# Patient Record
Sex: FEMALE | Race: WHITE | Employment: FULL TIME | URBAN - METROPOLITAN AREA
[De-identification: names, ages, dates, MRNs, and addresses within clinical notes are randomized per-mention and may not be internally consistent; named-entity substitution may affect disease eponyms.]

---

## 2017-05-05 RX ORDER — NORETHINDRONE ACETATE AND ETHINYL ESTRADIOL 1MG-20(21)
1 KIT ORAL DAILY
Qty: 1 PACKET | Refills: 2 | Status: SHIPPED | OUTPATIENT
Start: 2017-05-05 | End: 2017-06-16 | Stop reason: SDUPTHER

## 2017-06-16 ENCOUNTER — OFFICE VISIT (OUTPATIENT)
Dept: OBGYN CLINIC | Age: 32
End: 2017-06-16
Payer: COMMERCIAL

## 2017-06-16 ENCOUNTER — HOSPITAL ENCOUNTER (OUTPATIENT)
Age: 32
Setting detail: SPECIMEN
Discharge: HOME | End: 2017-06-16
Payer: COMMERCIAL

## 2017-06-16 VITALS
DIASTOLIC BLOOD PRESSURE: 72 MMHG | BODY MASS INDEX: 29.51 KG/M2 | HEART RATE: 78 BPM | SYSTOLIC BLOOD PRESSURE: 112 MMHG | WEIGHT: 188 LBS | HEIGHT: 67 IN

## 2017-06-16 DIAGNOSIS — R10.2 PELVIC PAIN IN FEMALE: ICD-10-CM

## 2017-06-16 DIAGNOSIS — Z01.419 WELL WOMAN EXAM WITH ROUTINE GYNECOLOGICAL EXAM: Primary | ICD-10-CM

## 2017-06-16 LAB
-: ABNORMAL
AMORPHOUS: ABNORMAL
BACTERIA: ABNORMAL
BILIRUBIN URINE: NEGATIVE
CASTS UA: ABNORMAL /LPF
COLOR: YELLOW
COMMENT UA: ABNORMAL
CRYSTALS, UA: ABNORMAL /HPF
DIRECT EXAM: NORMAL
EPITHELIAL CELLS UA: ABNORMAL /HPF
GLUCOSE URINE: NEGATIVE
KETONES, URINE: NEGATIVE
LEUKOCYTE ESTERASE, URINE: NEGATIVE
Lab: NORMAL
MUCUS: ABNORMAL
NITRITE, URINE: NEGATIVE
OTHER OBSERVATIONS UA: ABNORMAL
PH UA: 6 (ref 5–8)
PROTEIN UA: NEGATIVE
RBC UA: ABNORMAL /HPF
RENAL EPITHELIAL, UA: ABNORMAL /HPF
SPECIFIC GRAVITY UA: 1.01 (ref 1–1.03)
SPECIMEN DESCRIPTION: NORMAL
SPECIMEN DESCRIPTION: NORMAL
STATUS: NORMAL
TRICHOMONAS: ABNORMAL
TURBIDITY: CLEAR
URINE HGB: ABNORMAL
UROBILINOGEN, URINE: NORMAL
WBC UA: ABNORMAL /HPF
YEAST: ABNORMAL

## 2017-06-16 PROCEDURE — 87660 TRICHOMONAS VAGIN DIR PROBE: CPT

## 2017-06-16 PROCEDURE — 87480 CANDIDA DNA DIR PROBE: CPT

## 2017-06-16 PROCEDURE — 99395 PREV VISIT EST AGE 18-39: CPT | Performed by: NURSE PRACTITIONER

## 2017-06-16 PROCEDURE — 87510 GARDNER VAG DNA DIR PROBE: CPT

## 2017-06-16 PROCEDURE — 81001 URINALYSIS AUTO W/SCOPE: CPT

## 2017-06-16 PROCEDURE — 87491 CHLMYD TRACH DNA AMP PROBE: CPT

## 2017-06-16 PROCEDURE — 87591 N.GONORRHOEAE DNA AMP PROB: CPT

## 2017-06-16 PROCEDURE — 36415 COLL VENOUS BLD VENIPUNCTURE: CPT

## 2017-06-16 RX ORDER — PANTOPRAZOLE SODIUM 40 MG/1
TABLET, DELAYED RELEASE ORAL DAILY
Refills: 0 | COMMUNITY
Start: 2017-06-01

## 2017-06-16 RX ORDER — GABAPENTIN 100 MG/1
CAPSULE ORAL
Refills: 0 | COMMUNITY
Start: 2017-04-13 | End: 2019-08-05

## 2017-06-16 RX ORDER — NORETHINDRONE ACETATE AND ETHINYL ESTRADIOL 1MG-20(21)
1 KIT ORAL DAILY
Qty: 1 PACKET | Refills: 12 | Status: SHIPPED | OUTPATIENT
Start: 2017-06-16 | End: 2017-06-16 | Stop reason: CLARIF

## 2017-06-16 RX ORDER — PRAVASTATIN SODIUM 20 MG
TABLET ORAL DAILY
Refills: 0 | COMMUNITY
Start: 2017-05-26

## 2017-06-16 RX ORDER — VALACYCLOVIR HYDROCHLORIDE 1 G/1
TABLET, FILM COATED ORAL
Refills: 0 | COMMUNITY
Start: 2017-04-13 | End: 2019-12-10

## 2017-06-16 RX ORDER — NORETHINDRONE ACETATE AND ETHINYL ESTRADIOL 1MG-20(21)
1 KIT ORAL DAILY
Qty: 1 PACKET | Refills: 12 | Status: SHIPPED | OUTPATIENT
Start: 2017-06-16 | End: 2018-06-11 | Stop reason: SDUPTHER

## 2017-06-16 RX ORDER — BUSPIRONE HYDROCHLORIDE 10 MG/1
TABLET ORAL
Refills: 0 | COMMUNITY
Start: 2017-05-23 | End: 2019-08-05

## 2017-06-16 RX ORDER — IBUPROFEN 800 MG/1
TABLET ORAL EVERY 8 HOURS PRN
Refills: 0 | Status: ON HOLD | COMMUNITY
Start: 2017-05-04 | End: 2020-07-20 | Stop reason: HOSPADM

## 2017-06-16 ASSESSMENT — PATIENT HEALTH QUESTIONNAIRE - PHQ9
1. LITTLE INTEREST OR PLEASURE IN DOING THINGS: 0
SUM OF ALL RESPONSES TO PHQ9 QUESTIONS 1 & 2: 0
2. FEELING DOWN, DEPRESSED OR HOPELESS: 0
SUM OF ALL RESPONSES TO PHQ QUESTIONS 1-9: 0

## 2017-06-19 LAB
C TRACH DNA GENITAL QL NAA+PROBE: NEGATIVE
N. GONORRHOEAE DNA: NEGATIVE

## 2018-06-11 RX ORDER — NORETHINDRONE ACETATE AND ETHINYL ESTRADIOL AND FERROUS FUMARATE 1MG-20(21)
KIT ORAL
Qty: 28 TABLET | Refills: 0 | Status: SHIPPED | OUTPATIENT
Start: 2018-06-11 | End: 2018-07-01 | Stop reason: SDUPTHER

## 2018-07-02 ENCOUNTER — HOSPITAL ENCOUNTER (OUTPATIENT)
Age: 33
Setting detail: SPECIMEN
Discharge: HOME | End: 2018-07-02
Payer: COMMERCIAL

## 2018-07-02 ENCOUNTER — OFFICE VISIT (OUTPATIENT)
Dept: OBGYN CLINIC | Age: 33
End: 2018-07-02
Payer: COMMERCIAL

## 2018-07-02 VITALS
WEIGHT: 216 LBS | BODY MASS INDEX: 33.9 KG/M2 | HEART RATE: 80 BPM | SYSTOLIC BLOOD PRESSURE: 116 MMHG | DIASTOLIC BLOOD PRESSURE: 72 MMHG | HEIGHT: 67 IN

## 2018-07-02 DIAGNOSIS — Z11.51 SPECIAL SCREENING EXAMINATION FOR HUMAN PAPILLOMAVIRUS (HPV): ICD-10-CM

## 2018-07-02 DIAGNOSIS — Z01.419 WELL WOMAN EXAM: ICD-10-CM

## 2018-07-02 DIAGNOSIS — N94.6 DYSMENORRHEA: ICD-10-CM

## 2018-07-02 DIAGNOSIS — Z01.419 WELL WOMAN EXAM: Primary | ICD-10-CM

## 2018-07-02 PROCEDURE — G0145 SCR C/V CYTO,THINLAYER,RESCR: HCPCS

## 2018-07-02 PROCEDURE — 87624 HPV HI-RISK TYP POOLED RSLT: CPT

## 2018-07-02 PROCEDURE — 99395 PREV VISIT EST AGE 18-39: CPT | Performed by: NURSE PRACTITIONER

## 2018-07-02 RX ORDER — NORETHINDRONE ACETATE AND ETHINYL ESTRADIOL AND FERROUS FUMARATE 1MG-20(21)
KIT ORAL
Qty: 28 TABLET | Refills: 0 | Status: SHIPPED | OUTPATIENT
Start: 2018-07-02 | End: 2018-07-02 | Stop reason: SDUPTHER

## 2018-07-02 RX ORDER — NORETHINDRONE ACETATE AND ETHINYL ESTRADIOL 1MG-20(21)
1 KIT ORAL DAILY
Qty: 28 TABLET | Refills: 11 | Status: SHIPPED | OUTPATIENT
Start: 2018-07-02 | End: 2019-07-16 | Stop reason: SDUPTHER

## 2018-07-02 NOTE — PATIENT INSTRUCTIONS
Patient Education        Combination Birth Control Pills: Care Instructions  Your Care Instructions    Combination birth control pills are used to prevent pregnancy. They give you a regular dose of the hormones estrogen and progestin. You take a hormone pill every day to prevent pregnancy. Birth control pills come in packs. The most common type has 3 weeks of hormone pills. Some packs have sugar pills (they do not contain any hormones) for the fourth week. During that fourth no-hormone week, you have your period. After the fourth week (28 days), you start a new pack. Some birth control pills are packaged in different ways. For example, some have hormone pills for the fourth week instead of sugar pills. Taking hormones for the entire month causes you to not have periods or to have fewer periods. Others are packaged so that you have a period every 3 months. Your doctor will tell you what type of pills you have. Follow-up care is a key part of your treatment and safety. Be sure to make and go to all appointments, and call your doctor if you are having problems. It's also a good idea to know your test results and keep a list of the medicines you take. How can you care for yourself at home? How do you take the pill? · Follow your doctor's instructions about when to start taking your pills. Use backup birth control, such as a condom, or don't have intercourse for 7 days after you start your pills. · Take your pills every day, at about the same time of day. To help yourself do this, try to take them when you do something else every day, such as brushing your teeth. What if you forget to take a pill? Always read the label for specific instructions, or call your doctor. Here are some basic guidelines:  · If you miss 1 hormone pill, take it as soon as you remember. Ask your doctor if you may need to use a backup birth control method, such as a condom, or not have intercourse.   · If you miss 2 or more hormone pills,

## 2018-07-02 NOTE — PROGRESS NOTES
History Narrative    No narrative on file       MEDICATIONS:  Current Outpatient Prescriptions   Medication Sig Dispense Refill    norethindrone-ethinyl estradiol (JUNEL FE 1/20) 1-20 MG-MCG per tablet Take 1 tablet by mouth daily for 28 days 28 tablet 11    busPIRone (BUSPAR) 10 MG tablet   0    gabapentin (NEURONTIN) 100 MG capsule take 1 capsule by mouth at bedtime  0    ibuprofen (ADVIL;MOTRIN) 800 MG tablet take 1 tablet by mouth three times a day  0    pantoprazole (PROTONIX) 40 MG tablet   0    pravastatin (PRAVACHOL) 20 MG tablet   0    valACYclovir (VALTREX) 1 g tablet take 1 tablet by mouth three times a day for 7 days  0     No current facility-administered medications for this visit. ALLERGIES:  Allergies as of 07/02/2018 - Review Complete 07/02/2018   Allergen Reaction Noted    Flagyl [metronidazole] Nausea And Vomiting 10/10/2012       Symptoms of decreased mood absent  Symptoms of anhedonia absent    **If either question is answered in a  positive fashion then complete the PHQ9 Scoring Evaluation and make the appropriate referral**      Immunization status: stated as current, but no records available. Gynecologic History:  Menarche: 13 yo  Menopause at [address] [facility] yo     Patient's last menstrual period was 06/25/2018 (exact date). Sexually Active: No    STD History: No     Permanent Sterilization: No   Reversible Birth Control: Yes OCPs        Hormone Replacement Exposure: No      Genetic Qualified Family History of Breast, Ovarian , Colon or Uterine Cancer: Yes ( maternal grandmother with breast cancer in her 76s,  paternal grandmother with breast cancer- unsure of age). Patient given information on genetic testing- patient declines. If YES see scanned worksheet.     Preventative Health Testing:    Health Maintenance:  Health Maintenance Due   Topic Date Due    HIV screen  07/12/1998    DTaP/Tdap/Td vaccine (1 - Tdap) 07/12/2002       Date of Last Pap Smear: 6/2/2016 was noted. Digits were evaluated without abnormal findings. Range of motion, stability and strength were evaluated and found to be appropriate for the patients age. ASSESSMENT:      27 y.o. Annual   Diagnosis Orders   1. Well woman exam  PAP SMEAR   2. Dysmenorrhea  norethindrone-ethinyl estradiol (JUNEL FE 1/20) 1-20 MG-MCG per tablet   3. Special screening examination for human papillomavirus (HPV)  PAP SMEAR          Chief Complaint   Patient presents with    Gynecologic Exam          History reviewed. No pertinent past medical history. There are no active problems to display for this patient. Hereditary Breast, Ovarian, Colon and Uterine Cancer screening Done. Tobacco & Secondary smoke risks reviewed; instructed on cessation and avoidance      Counseling Completed:  Preventative Health Recommendations and Follow up. The patient was informed of the recommended preventative health recommendations. 1. Annuals every year; Cytology collections per prevailing guidelines. 2. Mammograms begin every year at 37 yo if no abnormalities are found and no family     History. 3. Bone density studies every 2-3 years. Begin at 71 yo. If no fracture history or osteoporosis family history. (significant). 4. Colonoscopy begin at 40 yo. Repeat every ten years if negative and no family history. 5. Calcium of 2859-7844 mg/day in split dosing  6. Vitamin D 400-800 IU/day  7. All other preventative health recommendations will be managed by the patients Primary care physician. Counseling Hormonal Based Birth Control:      The patient was seen and counseled on all forms of birth control both male and female  reversible and non. She is aware that hormonal based birth control may increase her risk of developing a blood clot which may increase her morbidity and or mortality. She was counseled on alternate non hormonal based contraception options.   We discussed that smoking and any hormonal

## 2018-07-05 LAB
HPV SAMPLE: NORMAL
HPV SOURCE: NORMAL
HPV, GENOTYPE 16: NOT DETECTED
HPV, GENOTYPE 18: NOT DETECTED
HPV, HIGH RISK OTHER: NOT DETECTED
HPV, INTERPRETATION: NORMAL

## 2018-07-18 LAB — CYTOLOGY REPORT: NORMAL

## 2018-10-03 DIAGNOSIS — N83.201 RIGHT OVARIAN CYST: Primary | ICD-10-CM

## 2018-10-09 ENCOUNTER — HOSPITAL ENCOUNTER (OUTPATIENT)
Dept: ULTRASOUND IMAGING | Age: 33
Discharge: HOME | End: 2018-10-11
Payer: COMMERCIAL

## 2018-10-09 DIAGNOSIS — N83.201 RIGHT OVARIAN CYST: ICD-10-CM

## 2018-10-09 PROCEDURE — 76830 TRANSVAGINAL US NON-OB: CPT

## 2018-10-09 PROCEDURE — 76856 US EXAM PELVIC COMPLETE: CPT

## 2018-10-10 ENCOUNTER — TELEPHONE (OUTPATIENT)
Dept: OBGYN CLINIC | Age: 33
End: 2018-10-10

## 2019-07-16 DIAGNOSIS — N94.6 DYSMENORRHEA: ICD-10-CM

## 2019-07-17 RX ORDER — NORETHINDRONE ACETATE AND ETHINYL ESTRADIOL AND FERROUS FUMARATE 1MG-20(21)
KIT ORAL
Qty: 28 TABLET | Refills: 0 | Status: SHIPPED | OUTPATIENT
Start: 2019-07-17 | End: 2019-08-22 | Stop reason: SDUPTHER

## 2019-08-05 ENCOUNTER — HOSPITAL ENCOUNTER (OUTPATIENT)
Age: 34
Setting detail: SPECIMEN
Discharge: HOME | End: 2019-08-05
Payer: COMMERCIAL

## 2019-08-05 ENCOUNTER — OFFICE VISIT (OUTPATIENT)
Dept: OBGYN CLINIC | Age: 34
End: 2019-08-05
Payer: COMMERCIAL

## 2019-08-05 VITALS
WEIGHT: 226 LBS | HEART RATE: 78 BPM | DIASTOLIC BLOOD PRESSURE: 78 MMHG | SYSTOLIC BLOOD PRESSURE: 122 MMHG | BODY MASS INDEX: 35.47 KG/M2 | HEIGHT: 67 IN

## 2019-08-05 DIAGNOSIS — N76.1 CHRONIC VAGINITIS: ICD-10-CM

## 2019-08-05 DIAGNOSIS — Z01.419 WELL WOMAN EXAM WITH ROUTINE GYNECOLOGICAL EXAM: ICD-10-CM

## 2019-08-05 DIAGNOSIS — R35.0 URINARY FREQUENCY: ICD-10-CM

## 2019-08-05 DIAGNOSIS — N94.10 DYSPAREUNIA IN FEMALE: ICD-10-CM

## 2019-08-05 DIAGNOSIS — Z01.419 WELL WOMAN EXAM WITH ROUTINE GYNECOLOGICAL EXAM: Primary | ICD-10-CM

## 2019-08-05 LAB
-: ABNORMAL
AMORPHOUS: ABNORMAL
BACTERIA: ABNORMAL
BILIRUBIN URINE: NEGATIVE
CASTS UA: ABNORMAL /LPF
COLOR: YELLOW
COMMENT UA: ABNORMAL
CRYSTALS, UA: ABNORMAL /HPF
DIRECT EXAM: NORMAL
EPITHELIAL CELLS UA: ABNORMAL /HPF
GLUCOSE URINE: NEGATIVE
KETONES, URINE: NEGATIVE
LEUKOCYTE ESTERASE, URINE: ABNORMAL
Lab: NORMAL
MUCUS: ABNORMAL
NITRITE, URINE: NEGATIVE
OTHER OBSERVATIONS UA: ABNORMAL
PH UA: 6 (ref 5–8)
PROTEIN UA: NEGATIVE
RBC UA: ABNORMAL /HPF
RENAL EPITHELIAL, UA: ABNORMAL /HPF
SPECIFIC GRAVITY UA: 1.01 (ref 1–1.03)
SPECIMEN DESCRIPTION: NORMAL
TRICHOMONAS: ABNORMAL
TURBIDITY: ABNORMAL
URINE HGB: NEGATIVE
UROBILINOGEN, URINE: NORMAL
WBC UA: ABNORMAL /HPF
YEAST: ABNORMAL

## 2019-08-05 PROCEDURE — 87491 CHLMYD TRACH DNA AMP PROBE: CPT

## 2019-08-05 PROCEDURE — 87591 N.GONORRHOEAE DNA AMP PROB: CPT

## 2019-08-05 PROCEDURE — 87480 CANDIDA DNA DIR PROBE: CPT

## 2019-08-05 PROCEDURE — 99395 PREV VISIT EST AGE 18-39: CPT | Performed by: CLINICAL NURSE SPECIALIST

## 2019-08-05 PROCEDURE — 87660 TRICHOMONAS VAGIN DIR PROBE: CPT

## 2019-08-05 PROCEDURE — 87510 GARDNER VAG DNA DIR PROBE: CPT

## 2019-08-05 PROCEDURE — 81001 URINALYSIS AUTO W/SCOPE: CPT

## 2019-08-05 PROCEDURE — 87086 URINE CULTURE/COLONY COUNT: CPT

## 2019-08-05 RX ORDER — ESCITALOPRAM OXALATE 20 MG/1
TABLET ORAL
Refills: 0 | COMMUNITY
Start: 2019-07-17 | End: 2019-12-10

## 2019-08-05 NOTE — PROGRESS NOTES
tablet by mouth three times a day for 7 days  0     No current facility-administered medications for this visit. ALLERGIES:  Allergies as of 08/05/2019 - Review Complete 08/05/2019   Allergen Reaction Noted    Flagyl [metronidazole] Nausea And Vomiting 10/10/2012       Symptoms of decreased mood absent  Symptoms of anhedonia absent    **If either question is answered in a  positive fashion then complete the PHQ9 Scoring Evaluation and make the appropriate referral**      Immunization status: stated up to date but no current records on file. Gynecologic History:  Menarche: 13 yo  Menopause at [address] [address] [place] yo     Patient's last menstrual period was 07/26/2019 (approximate). Sexually Active: Yes    STD History: No     Permanent Sterilization: No   Reversible Birth Control: Yes pill        Hormone Replacement Exposure: No      Genetic Qualified Family History of Breast, Ovarian , Colon or Uterine Cancer: Yes MGM, PGM breast CA and both over the age of 48, but patient not sure of exact age     If YES see scanned worksheet. Preventative Health Testing:    Health Maintenance:  Health Maintenance Due   Topic Date Due    Varicella Vaccine (1 of 2 - 13+ 2-dose series) 07/12/1996    HIV screen  07/12/1998    DTaP/Tdap/Td vaccine (1 - Tdap) 07/12/2002       Date of Last Pap Smear: 7/21/18 N/N  Abnormal Pap Smear History: NA  Colposcopy History: NA  Date of Last Mammogram: NA  Date of Last Colonoscopy: NA  Date of Last Bone Density:      ________________________________________________________________________        REVIEW OF SYSTEMS:    Yes   A minimum of an eleven point review of systems was completed. Review Of Systems (11 point):  Constitutional: No fever, chills or malaise;  No weight change or fatigue  Head and Eyes: No vision, Headache, Dizziness or trauma in last 12 months  ENT ROS: No hearing, Tinnitis, sinus or taste problems  Hematological and Lymphatic ROS:No Lymphoma, Von Willebrand's, Hemophillia

## 2019-08-06 ENCOUNTER — TELEPHONE (OUTPATIENT)
Dept: OBGYN CLINIC | Age: 34
End: 2019-08-06

## 2019-08-06 DIAGNOSIS — N30.00 ACUTE CYSTITIS WITHOUT HEMATURIA: Primary | ICD-10-CM

## 2019-08-06 LAB
CULTURE: NORMAL
Lab: NORMAL
SPECIMEN DESCRIPTION: NORMAL

## 2019-08-06 RX ORDER — NITROFURANTOIN 25; 75 MG/1; MG/1
100 CAPSULE ORAL 2 TIMES DAILY
Qty: 14 CAPSULE | Refills: 0 | Status: SHIPPED | OUTPATIENT
Start: 2019-08-06 | End: 2019-08-13

## 2019-08-08 LAB
C TRACH DNA GENITAL QL NAA+PROBE: NEGATIVE
N. GONORRHOEAE DNA: NEGATIVE
SPECIMEN DESCRIPTION: NORMAL

## 2019-08-13 ENCOUNTER — TELEPHONE (OUTPATIENT)
Dept: OBGYN CLINIC | Age: 34
End: 2019-08-13

## 2019-08-13 DIAGNOSIS — R35.0 URINARY FREQUENCY: Primary | ICD-10-CM

## 2019-08-22 DIAGNOSIS — N94.6 DYSMENORRHEA: ICD-10-CM

## 2019-08-22 RX ORDER — NORETHINDRONE ACETATE AND ETHINYL ESTRADIOL AND FERROUS FUMARATE 1MG-20(21)
KIT ORAL
Qty: 28 TABLET | Refills: 11 | Status: ON HOLD | OUTPATIENT
Start: 2019-08-22 | End: 2020-07-20 | Stop reason: HOSPADM

## 2019-11-12 ENCOUNTER — TELEPHONE (OUTPATIENT)
Dept: OBGYN CLINIC | Age: 34
End: 2019-11-12

## 2019-12-10 ENCOUNTER — OFFICE VISIT (OUTPATIENT)
Dept: OBGYN CLINIC | Age: 34
End: 2019-12-10
Payer: COMMERCIAL

## 2019-12-10 VITALS
DIASTOLIC BLOOD PRESSURE: 78 MMHG | HEART RATE: 72 BPM | SYSTOLIC BLOOD PRESSURE: 110 MMHG | WEIGHT: 226 LBS | BODY MASS INDEX: 35.47 KG/M2 | HEIGHT: 67 IN

## 2019-12-10 DIAGNOSIS — D25.2 SUBSEROUS LEIOMYOMA OF UTERUS: Primary | ICD-10-CM

## 2019-12-10 DIAGNOSIS — R10.2 PELVIC PAIN IN FEMALE: ICD-10-CM

## 2019-12-10 PROCEDURE — G8417 CALC BMI ABV UP PARAM F/U: HCPCS | Performed by: OBSTETRICS & GYNECOLOGY

## 2019-12-10 PROCEDURE — 99213 OFFICE O/P EST LOW 20 MIN: CPT | Performed by: OBSTETRICS & GYNECOLOGY

## 2019-12-10 PROCEDURE — 1036F TOBACCO NON-USER: CPT | Performed by: OBSTETRICS & GYNECOLOGY

## 2019-12-10 PROCEDURE — G8427 DOCREV CUR MEDS BY ELIG CLIN: HCPCS | Performed by: OBSTETRICS & GYNECOLOGY

## 2019-12-10 PROCEDURE — G8484 FLU IMMUNIZE NO ADMIN: HCPCS | Performed by: OBSTETRICS & GYNECOLOGY

## 2019-12-10 RX ORDER — MONTELUKAST SODIUM 10 MG/1
10 TABLET ORAL NIGHTLY
COMMUNITY

## 2019-12-10 RX ORDER — FLUOXETINE HYDROCHLORIDE 20 MG/1
20 CAPSULE ORAL DAILY
COMMUNITY

## 2020-03-09 ENCOUNTER — OFFICE VISIT (OUTPATIENT)
Dept: OBGYN CLINIC | Age: 35
End: 2020-03-09
Payer: COMMERCIAL

## 2020-03-09 PROCEDURE — 76856 US EXAM PELVIC COMPLETE: CPT | Performed by: OBSTETRICS & GYNECOLOGY

## 2020-03-09 PROCEDURE — 76830 TRANSVAGINAL US NON-OB: CPT | Performed by: OBSTETRICS & GYNECOLOGY

## 2020-03-10 ENCOUNTER — TELEPHONE (OUTPATIENT)
Dept: OBGYN CLINIC | Age: 35
End: 2020-03-10

## 2020-04-16 ENCOUNTER — TELEMEDICINE (OUTPATIENT)
Dept: OBGYN CLINIC | Age: 35
End: 2020-04-16
Payer: COMMERCIAL

## 2020-04-16 VITALS — BODY MASS INDEX: 34.53 KG/M2 | WEIGHT: 220 LBS | HEIGHT: 67 IN | HEART RATE: 87 BPM

## 2020-04-16 PROCEDURE — G8427 DOCREV CUR MEDS BY ELIG CLIN: HCPCS | Performed by: OBSTETRICS & GYNECOLOGY

## 2020-04-16 PROCEDURE — 99214 OFFICE O/P EST MOD 30 MIN: CPT | Performed by: OBSTETRICS & GYNECOLOGY

## 2020-04-16 PROCEDURE — 1036F TOBACCO NON-USER: CPT | Performed by: OBSTETRICS & GYNECOLOGY

## 2020-04-16 PROCEDURE — G8417 CALC BMI ABV UP PARAM F/U: HCPCS | Performed by: OBSTETRICS & GYNECOLOGY

## 2020-04-16 RX ORDER — CETIRIZINE HYDROCHLORIDE 10 MG/1
10 TABLET ORAL DAILY
COMMUNITY

## 2020-04-16 NOTE — PROGRESS NOTES
mouth once daily 28 tablet 11    ibuprofen (ADVIL;MOTRIN) 800 MG tablet take 1 tablet by mouth three times a day  0    pantoprazole (PROTONIX) 40 MG tablet   0    pravastatin (PRAVACHOL) 20 MG tablet   0     No current facility-administered medications for this visit. ALLERGIES:  Allergies as of 04/16/2020 - Review Complete 04/16/2020   Allergen Reaction Noted    Flagyl [metronidazole] Nausea And Vomiting 10/10/2012         Pulse 87, height 5' 7\" (1.702 m), weight 220 lb (99.8 kg), last menstrual period 04/02/2020, not currently breastfeeding. PE is limited due to the virtual visit    Abdomen: Soft non-tender; good bowel sounds. No guarding, rebound or rigidity. No CVA tenderness bilaterally reported when questioned. (Viewed Virtually)    Extremities: No calf tenderness, DTR 2/4, and No edema bilaterally as inspected by video and palpation by the patient (Viewed Virtually)    Pelvic: (Virtual Visit-Not Completed)    Diagnostics:            Lab Results:  Results for orders placed or performed during the hospital encounter of 08/05/19   VAGINITIS DNA PROBE   Result Value Ref Range    Specimen Description . VAGINA     Special Requests NOT REPORTED     Direct Exam NEGATIVE for Candida sp. Direct Exam NEGATIVE for Gardnerella vaginalis     Direct Exam NEGATIVE for Trichomonas vaginalis     Direct Exam       Method of testing is a DNA probe intended for detection and identification of Candida species, Gardnerella vaginalis, and Trichomonas vaginalis nucleic acid in vaginal fluid specimens from patients with symptoms of vaginitis/vaginosis. C.trachomatis N.gonorrhoeae DNA   Result Value Ref Range    Specimen Description . CERVIX     C. trachomatis DNA NEGATIVE NEGATIVE    N. gonorrhoeae DNA NEGATIVE NEGATIVE   Urine culture clean catch   Result Value Ref Range    Specimen Description . CLEAN CATCH URINE     Special Requests NOT REPORTED     Culture NO SIGNIFICANT GROWTH    Urinalysis Reflex to Culture Result Value Ref Range    Color, UA YELLOW YELLOW    Turbidity UA CLOUDY (A) CLEAR    Glucose, Ur NEGATIVE NEGATIVE    Bilirubin Urine NEGATIVE NEGATIVE    Ketones, Urine NEGATIVE NEGATIVE    Specific Gravity, UA 1.007 1.000 - 1.030    Urine Hgb NEGATIVE NEGATIVE    pH, UA 6.0 5.0 - 8.0    Protein, UA NEGATIVE NEGATIVE    Urobilinogen, Urine Normal Normal    Nitrite, Urine NEGATIVE NEGATIVE    Leukocyte Esterase, Urine LARGE (A) NEGATIVE    Urinalysis Comments NOT REPORTED    Microscopic Urinalysis   Result Value Ref Range    -          WBC, UA 20 TO 50 /HPF    RBC, UA 2 TO 5 /HPF    Casts UA NOT REPORTED /LPF    Crystals, UA NOT REPORTED None /HPF    Epithelial Cells UA 10 TO 20 /HPF    Renal Epithelial, UA NOT REPORTED 0 /HPF    Bacteria, UA MANY (A) None    Mucus, UA NOT REPORTED None    Trichomonas, UA NOT REPORTED None    Amorphous, UA NOT REPORTED None    Other Observations UA NOT REPORTED NOT REQ. Yeast, UA NOT REPORTED None     Cytology Report 07/02/2018  9:12  [facility]   (NOTE)   [id number]   [address] [place], [address]   [phone]   Fax: [phone]   GYNECOLOGIC CYTOLOGY REPORT     Patient Name: Clau Landrum   MR#: [id number]   Specimen #[id number]   Source:   1: Cervical material, (ThinPrep vial, Imaging-assisted review)     Clinical History   Contraceptive use   Z01.419 Routine gyn exam without abnormal findings   Z11.51 Encounter for screening for HPV   Co-Test:  ThinPrep Pap with high risk HPV testing   LMP:  6/25/2018   INTERPRETATION     Cervical material, (ThinPrep vial, Imaging-assisted review):   Specimen Adequacy:       Satisfactory for evaluation.       -Endocervical/transformation zone component is absent. Descriptive Diagnosis:       Negative for intraepithelial lesion or malignancy. Comments:       High Risk HPV testing was ordered.        Cytotechnologist:   CJ Cuba(ASCP)   **Electronically Signed Out**   merry/7/18/2018           Procedure/Addendum   HPV Procedure Report     Date Ordered:     7/3/2018     Status: Signed   Out       Date Complete:     7/3/2018     By: CJ Mercado(ASCP)       Date Reported:     7/18/2018       INTERPRETATION   Roche HPV DNA High Risk                                   HPV Sample               Thin Prep                    (Ref Range)   HPV Type 16               Not Detected                    (Not   Detected)   HPV Type 18               Not Detected                    (Not   Detected)   Other High Risk HPV     Not Detected                    (Not Detected)       This test amplifies and detects DNA of 14 high-risk HPV types   associated with cervical cancer and its precursor lesions (HPV types   16, 18, 31, 33, 35, 39, 45, 51, 52, 56, 58, 59, 66, and 68). Sensitivity may be affected by specimen collection methods, stage of   infection, and the presence of interfering substances.  Results should   be interpreted in conjunction with other available laboratory and   clinical data.  A negative high-risk HPV result does not exclude the   possibility of future cytologic HSIL or underlying CIN2-3 or cancer. This test is intended for medical purposes only and is not valid for   the evaluation of suspected sexual abuse or for other forensic   purposes.                Assessment:   Diagnosis Orders   1. Subserous leiomyoma of uterus posterior 5.1 x 5.3 x 4.5 cm     2. Dysmenorrhea     3. Dyspareunia in female     4. Menorrhagia with regular cycle     5. On OCP's for symptoms-NON Smoker      Chief Complaint   Patient presents with    Follow-up         There are no active problems to display for this patient. PLAN:  Return in about 1 week (around 4/23/2020) [facility]. RTO 1-2 wks emb hscope  Plan hysterectomy with BLS possible enterocele and Myomectomy ovarian preservation  Return to the office in 1-2 weeks.   Pt declined and had reviewed with her : [place], Lupron, IUD, on ocp's wishes to get off. Failed NSAIDS  Counseled on preventative health maintenance follow-up. No orders of the defined types were placed in this encounter. The uterus was not immobile. The patient  does have a Narrow Pubic Arch. Her uterus is found to be undescended. The uterine size is 10-12 cm and irregular. I counseled the patient on ALL routes to complete her surgery: Vaginal, Laparoscopic Assisted, Robotic Assisted, and Traditional open technique. The morbidity, procedural costs, recuperative times, infection rates, blood loss, duration of hospital stay and complication rates were discussed with each route. The patient has elected to proceed with TICO. John Williamson is a 37 y.o. female female was evaluated by a Virtual Visit (video visit) encounter to address concerns as mentioned above. A caregiver was present when appropriate. Due to this being a TeleHealth encounter (During QGNTO-39 public health emergency), evaluation of the following organ systems was limited: Vitals/Constitutional/EENT/Resp/CV/GI//MS/Neuro/Skin/Heme-Lymph-Imm. Pursuant to the emergency declaration under the [facility], [address] [place] [address] authority and the Eligible and [employer] Act, this Virtual Visit was conducted with patient's (and/or legal guardian's) consent, to reduce the patient's risk of exposure to COVID-19 and provide necessary medical care. The patient (and/or legal guardian) has also been advised to contact this office for worsening conditions or problems, and seek emergency medical treatment and/or call 911 if deemed necessary. Services were provided through a video synchronous discussion virtually to substitute for in-person clinic visit. Patient and provider were located at their individual homes.     Electronically signed by Griselda Lea DO on 4/16/20 at 9:20 AM EDT     An electronic signature was used to authenticate this note. The Virtual Visit time of 25 minutes. More than 50% of this visit was counseling and education regarding The primary encounter diagnosis was Subserous leiomyoma of uterus posterior 5.1 x 5.3 x 4.5 cm. Diagnoses of Dysmenorrhea, Dyspareunia in female, and Menorrhagia with regular cycle were also pertinent to this visit. and Follow-up   as well as  counseling on preventative health maintenance follow-up.

## 2020-05-11 ENCOUNTER — HOSPITAL ENCOUNTER (OUTPATIENT)
Age: 35
Setting detail: SPECIMEN
Discharge: HOME | End: 2020-05-11
Payer: COMMERCIAL

## 2020-05-11 ENCOUNTER — PROCEDURE VISIT (OUTPATIENT)
Dept: OBGYN CLINIC | Age: 35
End: 2020-05-11
Payer: COMMERCIAL

## 2020-05-11 VITALS
SYSTOLIC BLOOD PRESSURE: 112 MMHG | HEIGHT: 67 IN | WEIGHT: 220 LBS | TEMPERATURE: 97.7 F | BODY MASS INDEX: 34.53 KG/M2 | DIASTOLIC BLOOD PRESSURE: 80 MMHG | HEART RATE: 70 BPM

## 2020-05-11 PROBLEM — D25.2 SUBSEROUS LEIOMYOMA OF UTERUS: Status: ACTIVE | Noted: 2020-05-11

## 2020-05-11 PROBLEM — N92.0 MENORRHAGIA WITH REGULAR CYCLE: Status: ACTIVE | Noted: 2020-05-11

## 2020-05-11 PROBLEM — N94.10 DYSPAREUNIA IN FEMALE: Status: ACTIVE | Noted: 2020-05-11

## 2020-05-11 PROBLEM — N94.6 DYSMENORRHEA: Status: ACTIVE | Noted: 2020-05-11

## 2020-05-11 LAB
ABSOLUTE EOS #: 0.27 K/UL (ref 0–0.44)
ABSOLUTE IMMATURE GRANULOCYTE: 0.03 K/UL (ref 0–0.3)
ABSOLUTE LYMPH #: 1.8 K/UL (ref 1.1–3.7)
ABSOLUTE MONO #: 0.58 K/UL (ref 0.1–1.2)
BASOPHILS # BLD: 1 % (ref 0–2)
BASOPHILS ABSOLUTE: 0.04 K/UL (ref 0–0.2)
CONTROL: NORMAL
DIFFERENTIAL TYPE: ABNORMAL
EOSINOPHILS RELATIVE PERCENT: 3 % (ref 1–4)
HCT VFR BLD CALC: 38.7 % (ref 36.3–47.1)
HEMOGLOBIN: 12.7 G/DL (ref 11.9–15.1)
IMMATURE GRANULOCYTES: 0 %
INR BLD: 0.9
LYMPHOCYTES # BLD: 21 % (ref 24–43)
MCH RBC QN AUTO: 29 PG (ref 25.2–33.5)
MCHC RBC AUTO-ENTMCNC: 32.8 G/DL (ref 28.4–34.8)
MCV RBC AUTO: 88.4 FL (ref 82.6–102.9)
MONOCYTES # BLD: 7 % (ref 3–12)
NRBC AUTOMATED: 0 PER 100 WBC
PARTIAL THROMBOPLASTIN TIME: 21.4 SEC (ref 20.5–30.5)
PDW BLD-RTO: 12.7 % (ref 11.8–14.4)
PLATELET # BLD: 332 K/UL (ref 138–453)
PLATELET ESTIMATE: ABNORMAL
PMV BLD AUTO: 11.9 FL (ref 8.1–13.5)
PREGNANCY TEST URINE, POC: NEGATIVE
PROTHROMBIN TIME: 9.4 SEC (ref 9–12)
RBC # BLD: 4.38 M/UL (ref 3.95–5.11)
RBC # BLD: ABNORMAL 10*6/UL
SEG NEUTROPHILS: 68 % (ref 36–65)
SEGMENTED NEUTROPHILS ABSOLUTE COUNT: 5.79 K/UL (ref 1.5–8.1)
TSH SERPL DL<=0.05 MIU/L-ACNC: 2.82 MIU/L (ref 0.3–5)
WBC # BLD: 8.5 K/UL (ref 3.5–11.3)
WBC # BLD: ABNORMAL 10*3/UL

## 2020-05-11 PROCEDURE — 81025 URINE PREGNANCY TEST: CPT | Performed by: OBSTETRICS & GYNECOLOGY

## 2020-05-11 PROCEDURE — 58558 HYSTEROSCOPY BIOPSY: CPT | Performed by: OBSTETRICS & GYNECOLOGY

## 2020-05-11 PROCEDURE — 88305 TISSUE EXAM BY PATHOLOGIST: CPT

## 2020-05-11 NOTE — PROGRESS NOTES
[facility]  Flexible-3mm  Hysteroscopy Procedure Note      Kenzie Walkers  5/11/2020  Patient's last menstrual period was 05/05/2020. Chief Complaint   Patient presents with    Procedure         Blood pressure 112/80, pulse 70, temperature 97.7 °F (36.5 °C), height 5' 7\" (1.702 m), weight 220 lb (99.8 kg), last menstrual period 05/05/2020, not currently breastfeeding. UltraSound Findings:           LABS:  UPT: negative    6/17/2016  1:07 PM - [facility]   Cytology Report 06/02/2016  8:04 AM - [facility]   (NOTE)   [id number]   [facility]   [facility]   ANATOMIC PATHOLOGY   [address] [place] [address], [place] [address]. [place], [address]   [phone]   Fax: [phone]   GYNECOLOGIC CYTOLOGY REPORT     Patient Name: Hilario Mortimer   MR#: [id number]   Specimen #[id number]   Source:   1: Cervical material, (ThinPrep vial, Imaging-assisted review)     Clinical History   Birth control pills   Z01.419 Routine gyn exam without abnormal findings   Z11.51 Encounter for screening for HPV   Co-Test:  ThinPrep Pap with high risk HPV testing   LMP:  5/26/16   INTERPRETATION     Cervical material, (ThinPrep vial, Imaging-assisted review):   Specimen Adequacy:       Satisfactory for evaluation.       - Endocervical/transformation zone component present. Descriptive Diagnosis:       Negative for intraepithelial lesion or malignancy. Comments:       High Risk HPV testing was ordered.        Cytotechnologist:   CJ Cuba(ASCP)   **Electronically Signed Out**   merry/6/17/2016           Procedure/Addendum   HPV Procedure Report     Date Ordered:     6/6/2016     Status: Signed   Out       Date Complete:     6/6/2016     By: CJ Young(ASCP)       Date Reported:     6/17/2016       INTERPRETATION   Roche HPV DNA High Risk                                   HPV Sample               Thin Prep                    (Ref Range)   HPV Type 16               Not Detected                    (Not   Detected)   HPV Type 18               Not Detected                    (Not   Detected)   Other High Risk HPV     Not Detected                    (Not Detected)       This test amplifies and detects DNA of 14 high-risk HPV types   associated with cervical cancer and its precursor lesions (HPV types   16, 18, 31, 33, 35, 39, 45, 51, 52, 56, 58, 59, 66, and 68). Sensitivity may be affected by specimen collection methods, stage of   infection, and the presence of interfering substances.  Results should   be interpreted in conjunction with other available laboratory and   clinical data.  A negative high-risk HPV result does not exclude the   possibility of future cytologic HSIL or underlying CIN2-3 or cancer. This test is intended for medical purposes only and is not valid for   the evaluation of suspected sexual abuse or for other forensic   purposes.              Procedure visit on 05/11/2020   Component Date Value Ref Range Status    Preg Test, Ur 05/11/2020 negative   Final    lot:  [id number] exp: 06/30/2021    Control 05/11/2020 done   Final   ]    Diagnosis:    Diagnosis Orders   1. Subserous leiomyoma of uterus posterior 5.1 x 5.3 x 4.5 cm  SC HYSTEROSCOPY,W/ENDO BX    Specimen to Pathology Outpatient   2. Dysmenorrhea  SC HYSTEROSCOPY,W/ENDO BX    Specimen to Pathology Outpatient   3. Negative pregnancy test  POCT urine pregnancy   4. Dyspareunia in female     5.  Menorrhagia with regular cycle  CBC Auto Differential    TSH with Reflex    Protime-INR    APTT     Patient Active Problem List    Diagnosis Date Noted    Dysmenorrhea 05/11/2020     Priority: High    Subserous leiomyoma of uterus posterior 5.1 x 5.3 x 4.5 cm 05/11/2020     Priority: High    Dyspareunia in female 05/11/2020     Priority: High    Menorrhagia with regular cycle 05/11/2020     Priority: High         Procedure Note:  After voiding, the patient returned to the exam room where she was Priority: High    Menorrhagia with regular cycle 2020     Priority: High           Recommendations:  Return to the office for follow-up in 2 weeks to review the pathology of the biopsy and for further recommendations. Patient is considering hysterectomy. The patient is a . She does NOT want any children. She is not . I reviewed conservative options; IUD Nexplanon . .. Smoker hx >36 yo, to remove her fibroid with or without Ablation and leave her uterus so she could attempt conception and a family. She declined and does not want any children and is demanding definitive surgery. Pt to bring letter. Labs ordered-See above  No family hx of ovarian cancer      The uterus was immobile. The patient  does have a Narrow Pubic Arch. Her uterus is found to be undescended. The uterine size is 10-14 cm and irregular. I counseled the patient on ALL routes to complete her surgery: Vaginal, Laparoscopic Assisted, Robotic Assisted, and Traditional open technique. The morbidity, procedural costs, recuperative times, infection rates, blood loss, duration of hospital stay and complication rates were discussed with each route. The patient has elected to proceed with Open.           Claire Gee,

## 2020-05-14 LAB — SURGICAL PATHOLOGY REPORT: NORMAL

## 2020-05-29 ENCOUNTER — TELEMEDICINE (OUTPATIENT)
Dept: OBGYN CLINIC | Age: 35
End: 2020-05-29
Payer: COMMERCIAL

## 2020-05-29 VITALS — HEIGHT: 67 IN | HEART RATE: 71 BPM | WEIGHT: 220 LBS | BODY MASS INDEX: 34.53 KG/M2

## 2020-05-29 PROCEDURE — G8427 DOCREV CUR MEDS BY ELIG CLIN: HCPCS | Performed by: OBSTETRICS & GYNECOLOGY

## 2020-05-29 PROCEDURE — 99214 OFFICE O/P EST MOD 30 MIN: CPT | Performed by: OBSTETRICS & GYNECOLOGY

## 2020-05-29 NOTE — PROGRESS NOTES
[id number] [phone]   Fax: [phone]   GYNECOLOGIC CYTOLOGY REPORT     Patient Name: Jennifer Bowling   MR#: [id number]   Specimen #[id number]   Source:   1: Cervical material, (ThinPrep vial, Imaging-assisted review)     Clinical History   Birth control pills   Z01.419 Routine gyn exam without abnormal findings   Z11.51 Encounter for screening for HPV   Co-Test:  ThinPrep Pap with high risk HPV testing   LMP:  5/26/16   INTERPRETATION     Cervical material, (ThinPrep vial, Imaging-assisted review):   Specimen Adequacy:       Satisfactory for evaluation.       - Endocervical/transformation zone component present. Descriptive Diagnosis:       Negative for intraepithelial lesion or malignancy. Comments:       High Risk HPV testing was ordered. Cytotechnologist:   CJ Cuba(ASCP)   **Electronically Signed Out**   merry/6/17/2016           Procedure/Addendum   HPV Procedure Report     Date Ordered:     6/6/2016     Status: Signed   Out       Date Complete:     6/6/2016     By: CJ Coleman(ASCP)       Date Reported:     6/17/2016       INTERPRETATION   Roche HPV DNA High Risk                                   HPV Sample               Thin Prep                    (Ref Range)   HPV Type 16               Not Detected                    (Not   Detected)   HPV Type 18               Not Detected                    (Not   Detected)   Other High Risk HPV     Not Detected                    (Not Detected)       This test amplifies and detects DNA of 14 high-risk HPV types   associated with cervical cancer and its precursor lesions (HPV types   16, 18, 31, 33, 35, 39, 45, 51, 52, 56, 58, 59, 66, and 68).    Sensitivity may be affected by specimen collection methods, stage of   infection, and the presence of interfering substances.  Results should   be interpreted in conjunction with other available laboratory and   clinical data.  A negative high-risk HPV result does not exclude the   possibility of future cytologic HSIL or underlying CIN2-3 or cancer. This test is intended for medical purposes only and is not valid for   the evaluation of suspected sexual abuse or for other forensic   purposes.                 Lab Results:  Results for orders placed or performed during the hospital encounter of 05/11/20   CBC Auto Differential   Result Value Ref Range    WBC 8.5 3.5 - 11.3 k/uL    RBC 4.38 3.95 - 5.11 m/uL    Hemoglobin 12.7 11.9 - 15.1 g/dL    Hematocrit 38.7 36.3 - 47.1 %    MCV 88.4 82.6 - 102.9 fL    MCH 29.0 25.2 - 33.5 pg    MCHC 32.8 28.4 - 34.8 g/dL    RDW 12.7 11.8 - 14.4 %    Platelets 738 228 - 842 k/uL    MPV 11.9 8.1 - 13.5 fL    NRBC Automated 0.0 0.0 per 100 WBC    Differential Type NOT REPORTED     Seg Neutrophils 68 (H) 36 - 65 %    Lymphocytes 21 (L) 24 - 43 %    Monocytes 7 3 - 12 %    Eosinophils % 3 1 - 4 %    Basophils 1 0 - 2 %    Immature Granulocytes 0 0 %    Segs Absolute 5.79 1.50 - 8.10 k/uL    Absolute Lymph # 1.80 1.10 - 3.70 k/uL    Absolute Mono # 0.58 0.10 - 1.20 k/uL    Absolute Eos # 0.27 0.00 - 0.44 k/uL    Basophils Absolute 0.04 0.00 - 0.20 k/uL    Absolute Immature Granulocyte 0.03 0.00 - 0.30 k/uL    WBC Morphology NOT REPORTED     RBC Morphology NOT REPORTED     Platelet Estimate NOT REPORTED    TSH with Reflex   Result Value Ref Range    TSH 2.82 0.30 - 5.00 mIU/L   Protime-INR   Result Value Ref Range    Protime 9.4 9.0 - 12.0 sec    INR 0.9    APTT   Result Value Ref Range    PTT 21.4 20.5 - 30.5 sec     5/14/2020 11:44 AM - Cesar Lawrence Incoming Lab Results From [facility]     Component Collected Lab   Surgical Pathology Report 05/11/2020 12:13  [facility]   [address].    [place], [address] [place] [address]   [phone]   Fax: [phone]   SURGICAL PATHOLOGY REPORT     Patient Name: Nikole Cadet   MR#: [id number]   Specimen #[id number]         Final Diagnosis   ENDOMETRIUM, BIOPSY:          BENIGN ENDOMETRIAL

## 2020-07-01 ENCOUNTER — OFFICE VISIT (OUTPATIENT)
Dept: OBGYN CLINIC | Age: 35
End: 2020-07-01
Payer: COMMERCIAL

## 2020-07-01 VITALS
BODY MASS INDEX: 36.73 KG/M2 | HEART RATE: 70 BPM | SYSTOLIC BLOOD PRESSURE: 116 MMHG | TEMPERATURE: 98.1 F | DIASTOLIC BLOOD PRESSURE: 86 MMHG | HEIGHT: 67 IN | WEIGHT: 234 LBS

## 2020-07-01 PROCEDURE — G8427 DOCREV CUR MEDS BY ELIG CLIN: HCPCS | Performed by: OBSTETRICS & GYNECOLOGY

## 2020-07-01 PROCEDURE — 99213 OFFICE O/P EST LOW 20 MIN: CPT | Performed by: OBSTETRICS & GYNECOLOGY

## 2020-07-01 PROCEDURE — G8417 CALC BMI ABV UP PARAM F/U: HCPCS | Performed by: OBSTETRICS & GYNECOLOGY

## 2020-07-01 PROCEDURE — 1036F TOBACCO NON-USER: CPT | Performed by: OBSTETRICS & GYNECOLOGY

## 2020-07-01 NOTE — H&P (VIEW-ONLY)
[address] [facility] Gynecology  [facility] [address]; [address]  [place] [phone] Fax        Arpan Canada  1985  Primary Care Physician: Jad Mirza MD        [address]: [facility]      2020  MEDICAID CONSENT COMPLETED: No      HPI: Arpan Canada is a 37 y.o. female   Pt has the diagnosis/symptoms below affecting her ADL's. Conservative medical and surgical options were reviewed to preserve her uterus for future fertility. She has declined all other options and is demanding hysterectomy. SEE LETTER WRITTEN BY PATIENT. 1. Dysmenorrhea    2. Subserous leiomyoma of uterus posterior 5.1 x 5.3 x 4.5 cm    3. Menorrhagia with regular cycle    4. Dyspareunia in female    5. Pelvic pain in female            The uterus was immobile. The patient  does have a Narrow Pubic Arch. Her uterus is found to be undescended. The uterine size is 10-14 cm and irregular. I counseled the patient on ALL routes to complete her surgery: Vaginal, Laparoscopic Assisted, Robotic Assisted, and Traditional open technique. The morbidity, procedural costs, recuperative times, infection rates, blood loss, duration of hospital stay and complication rates were discussed with each route. The patient has elected to proceed with Open. Relevant Past History:   Patient Active Problem List    Diagnosis Date Noted    Dysmenorrhea 2020     Priority: High    Subserous leiomyoma of uterus posterior 5.1 x 5.3 x 4.5 cm 2020     Priority: High    Dyspareunia in female 2020     Priority: High    Menorrhagia with regular cycle 2020     Priority: High         OB History    Para Term  AB Living   0 0 0 0 0 0   SAB TAB Ectopic Molar Multiple Live Births   0 0 0 0 0 0       Past Medical History:   Diagnosis Date    Anxiety     Clostridium difficile infection        History reviewed. No pertinent surgical history.     Social History Socioeconomic History    Marital status: Single     Spouse name: Not on file    Number of children: Not on file    Years of education: Not on file    Highest education level: Not on file   Occupational History    Not on file   Social Needs    Financial resource strain: Not on file    Food insecurity     Worry: Not on file     Inability: Not on file    Transportation needs     Medical: Not on file     Non-medical: Not on file   Tobacco Use    Smoking status: Former Smoker     Packs/day: 1.50     Years: 9.50     Pack years: 14.25     Types: Cigarettes    Smokeless tobacco: Never Used   Substance and Sexual Activity    Alcohol use:  Yes     Alcohol/week: 0.0 standard drinks     Comment: OCC    Drug use: No    Sexual activity: Yes     Partners: Male     Birth control/protection: Pill   Lifestyle    Physical activity     Days per week: Not on file     Minutes per session: Not on file    Stress: Not on file   Relationships    Social connections     Talks on phone: Not on file     Gets together: Not on file     Attends Denominational service: Not on file     Active member of club or organization: Not on file     Attends meetings of clubs or organizations: Not on file     Relationship status: Not on file    Intimate partner violence     Fear of current or ex partner: Not on file     Emotionally abused: Not on file     Physically abused: Not on file     Forced sexual activity: Not on file   Other Topics Concern    Not on file   Social History Narrative    Not on file       Psychosocial History: stable    MEDICATIONS:  Current Outpatient Medications   Medication Sig Dispense Refill    cetirizine (ZYRTEC) 10 MG tablet Take 10 mg by mouth daily      FLUoxetine (PROZAC) 20 MG capsule Take 20 mg by mouth daily      montelukast (SINGULAIR) 10 MG tablet Take 10 mg by mouth nightly      JUNEL FE 1/20 1-20 MG-MCG per tablet take 1 tablet by mouth once daily 28 tablet 11    ibuprofen (ADVIL;MOTRIN) 800 MG tablet take 1 tablet by mouth three times a day  0    pantoprazole (PROTONIX) 40 MG tablet   0    pravastatin (PRAVACHOL) 20 MG tablet   0    Multiple Vitamins-Minerals (VITAMIN D3 COMPLETE PO)        No current facility-administered medications for this visit. ALLERGIES:  Allergies as of 07/01/2020 - Review Complete 07/01/2020   Allergen Reaction Noted    Flagyl [metronidazole] Nausea And Vomiting 10/10/2012           REVIEW OF SYSTEMS:      General appearance:Appears healthy. Alert; in no acute distress. Pleasant. HEENT: Glasses or contacts no  CARDIOVASCULAR: Denies: chest pain, dyspnea on exertion, palpitations  RESPIRATORY: Denies: cough, shortness of breath, wheezing  GI: Denies: abdominal pain, flank pain, nausea, vomiting, diarrhea  : Denies: dysuria, frequency/urgency, hematuria, pelvic pain  MUSCULOSKELETAL: Denies: back pain, joint swelling, muscle pain  SKIN: Denies: rash, hives. HEMATOLOGIC: Denies Bleeding Disorder, Coagulopathy or Transfusion  NEUROLOGIC: Denies Migraines, Headaches, CVA, TIA  ENDOCRINE: Denies any Endocrinologic disorders                PHYSICAL EXAM:  Blood pressure 116/86, pulse 70, temperature 98.1 °F (36.7 °C), temperature source Temporal, height 5' 7\" (1.702 m), weight 234 lb (106.1 kg), last menstrual period 06/20/2020, not currently breastfeeding. General Appearance:  awake, alert, oriented, in no acute distress, well developed, well nourished, in no acute distress   Skin:  Skin color, texture, turgor normal. No rashes or lesions  Mouth/Throat:  Mucosa moist.  No lesions. Pharynx without erythema, edema or exudate. Lungs:  Normal expansion. Clear to auscultation. No rales, rhonchi, or wheezing. Heart:  Heart sounds are normal.  Regular rate and rhythm without murmur, gallop or rub. Breast:  Inspection negative. No nipple discharge or bleeding. No palpable mass  Abdomen:  Soft, non-tender, normal bowel sounds.   No bruits, organomegaly or masses. Extremities: Extremities warm to touch, pink, with no edema. Musculoskeletal:  negative  Peripheral Pulses:  Normal  Neurologic:  Gait normal. Reflexes normal and symmetric. Sensation grossly intact. Female Urogen:  declined  Female Rectal: declined    OMM Structural Component:  The patient did not complain of a Chief complaint requiring OMM. Chief Complaint:none    Structural Exam: No Interest              Diagnostics:  [facility] Findings:              LABS:  UPT: negative     6/17/2016  1:07 PM - [place], Lab [employer]      Component Collected Lab   Cytology Report 06/02/2016  8:04 AM [facility]   (NOTE)   [id number]   [place]   [facility]   ANATOMIC PATHOLOGY   [address] [place] [address],  [address]. [place], [address]   [phone]   Fax: [phone]   GYNECOLOGIC CYTOLOGY REPORT     Patient Name: Fritz Salinas   MR#: [id number]   Specimen #[id number]   Source:   1: Cervical material, (ThinPrep vial, Imaging-assisted review)     Clinical History   Birth control pills   Z01.419 Routine gyn exam without abnormal findings   Z11.51 Encounter for screening for HPV   Co-Test:  ThinPrep Pap with high risk HPV testing   LMP:  5/26/16   INTERPRETATION     Cervical material, (ThinPrep vial, Imaging-assisted review):   Specimen Adequacy:       Satisfactory for evaluation.       - Endocervical/transformation zone component present. Descriptive Diagnosis:       Negative for intraepithelial lesion or malignancy. Comments:       High Risk HPV testing was ordered.        Cytotechnologist:   CJ Cuba(ASCP)   **Electronically Signed Out**   merry/6/17/2016           Procedure/Addendum   HPV Procedure Report     Date Ordered:     6/6/2016     Status: Signed   Out       Date Complete:     6/6/2016     By: CJ Timmons(ASCP)       Date Reported:     6/17/2016       INTERPRETATION   Roche HPV DNA High Risk                                   HPV Sample               Thin Prep                    (Ref Range)   HPV Type 16               Not Detected                    (Not   Detected)   HPV Type 18               Not Detected                    (Not   Detected)   Other High Risk HPV     Not Detected                    (Not Detected)       This test amplifies and detects DNA of 14 high-risk HPV types   associated with cervical cancer and its precursor lesions (HPV types   16, 18, 31, 33, 35, 39, 45, 51, 52, 56, 58, 59, 66, and 68). Sensitivity may be affected by specimen collection methods, stage of   infection, and the presence of interfering substances.  Results should   be interpreted in conjunction with other available laboratory and   clinical data.  A negative high-risk HPV result does not exclude the   possibility of future cytologic HSIL or underlying CIN2-3 or cancer.    This test is intended for medical purposes only and is not valid for   the evaluation of suspected sexual abuse or for other forensic   purposes.                    Lab Results:        Results for orders placed or performed during the hospital encounter of 05/11/20   CBC Auto Differential   Result Value Ref Range     WBC 8.5 3.5 - 11.3 k/uL     RBC 4.38 3.95 - 5.11 m/uL     Hemoglobin 12.7 11.9 - 15.1 g/dL     Hematocrit 38.7 36.3 - 47.1 %     MCV 88.4 82.6 - 102.9 fL     MCH 29.0 25.2 - 33.5 pg     MCHC 32.8 28.4 - 34.8 g/dL     RDW 12.7 11.8 - 14.4 %     Platelets 686 167 - 849 k/uL     MPV 11.9 8.1 - 13.5 fL     NRBC Automated 0.0 0.0 per 100 WBC     Differential Type NOT REPORTED       Seg Neutrophils 68 (H) 36 - 65 %     Lymphocytes 21 (L) 24 - 43 %     Monocytes 7 3 - 12 %     Eosinophils % 3 1 - 4 %     Basophils 1 0 - 2 %     Immature Granulocytes 0 0 %     Segs Absolute 5.79 1.50 - 8.10 k/uL     Absolute Lymph # 1.80 1.10 - 3.70 k/uL     Absolute Mono # 0.58 0.10 - 1.20 k/uL     Absolute Eos # 0.27 0.00 - 0.44 k/uL     Basophils Absolute 0.04 0.00 - 0.20 k/uL     Absolute Immature Granulocyte 0.03 Lymphocytes 21 (L) 24 - 43 %    Monocytes 7 3 - 12 %    Eosinophils % 3 1 - 4 %    Basophils 1 0 - 2 %    Immature Granulocytes 0 0 %    Segs Absolute 5.79 1.50 - 8.10 k/uL    Absolute Lymph # 1.80 1.10 - 3.70 k/uL    Absolute Mono # 0.58 0.10 - 1.20 k/uL    Absolute Eos # 0.27 0.00 - 0.44 k/uL    Basophils Absolute 0.04 0.00 - 0.20 k/uL    Absolute Immature Granulocyte 0.03 0.00 - 0.30 k/uL    WBC Morphology NOT REPORTED     RBC Morphology NOT REPORTED     Platelet Estimate NOT REPORTED    TSH with Reflex   Result Value Ref Range    TSH 2.82 0.30 - 5.00 mIU/L   Protime-INR   Result Value Ref Range    Protime 9.4 9.0 - 12.0 sec    INR 0.9    APTT   Result Value Ref Range    PTT 21.4 20.5 - 30.5 sec           The patient was counseled at length about the risks of lashawn Covid-19 in the emerson-operative and post-operative states including the recovery window of their procedure. The patient was made aware that lashawn Covid-19 after a surgical procedure may worsen their prognosis for recovering from the virus and lend to a higher morbidity and or mortality risk. The patient was given the options of postponing their procedure. All of the risks, benefits, and alternatives were discussed. The patient  does wish to proceed with the procedure. Assessment:     Diagnosis Orders   1. Dysmenorrhea     2. Subserous leiomyoma of uterus posterior 5.1 x 5.3 x 4.5 cm     3. Menorrhagia with regular cycle     4. Dyspareunia in female     5. Pelvic pain in female         Patient Active Problem List    Diagnosis Date Noted    Dysmenorrhea 05/11/2020     Priority: High    Subserous leiomyoma of uterus posterior 5.1 x 5.3 x 4.5 cm 05/11/2020     Priority: High    Dyspareunia in female 05/11/2020     Priority: High    Menorrhagia with regular cycle 05/11/2020     Priority: High       PAP: see above  Permanent Sterilization Completed: No     Plan:  1. TICO BLS  2. +/- Enterocele  3. Negative Wound vac placement  4.  Body mass index is 36.65 kg/m². No orders of the defined types were placed in this encounter. Counseling: The patient was counseled on all options both medical and surgical, conservative as well as definitive. She has elected to proceed with the procedure as stated above. The patient was counseled on the procedure. Risks and complications were reviewed in detail. The patients orders, labs, consents have been completed. The history and physical as well as all supporting surgical documentation will be forwarded to the pre-operative holding area. The patient is aware that this procedure may not alleviate her symptoms. That there may be a necessity for a second surgery and that there may be an incomplete removal of abnormal tissue. A supracervical approach was reviewed in detail, leaving the cervix intact.                    ________________________________________D. O.  Date:_______________  Linda Diggs DO

## 2020-07-13 ENCOUNTER — HOSPITAL ENCOUNTER (OUTPATIENT)
Dept: PREADMISSION TESTING | Age: 35
Discharge: HOME | End: 2020-07-17
Payer: COMMERCIAL

## 2020-07-13 VITALS
HEART RATE: 71 BPM | HEIGHT: 67 IN | OXYGEN SATURATION: 100 % | BODY MASS INDEX: 34.53 KG/M2 | WEIGHT: 220 LBS | TEMPERATURE: 97.7 F | RESPIRATION RATE: 20 BRPM | DIASTOLIC BLOOD PRESSURE: 77 MMHG | SYSTOLIC BLOOD PRESSURE: 126 MMHG

## 2020-07-13 LAB
-: ABNORMAL
ABO/RH: NORMAL
ABSOLUTE EOS #: 0.4 K/UL (ref 0–0.4)
ABSOLUTE IMMATURE GRANULOCYTE: ABNORMAL K/UL (ref 0–0.3)
ABSOLUTE LYMPH #: 1.8 K/UL (ref 1–4.8)
ABSOLUTE MONO #: 0.6 K/UL (ref 0.1–1.3)
AMORPHOUS: ABNORMAL
ANION GAP SERPL CALCULATED.3IONS-SCNC: 10 MMOL/L (ref 9–17)
ANTIBODY SCREEN: NEGATIVE
ARM BAND NUMBER: NORMAL
BACTERIA: ABNORMAL
BASOPHILS # BLD: 1 % (ref 0–2)
BASOPHILS ABSOLUTE: 0.1 K/UL (ref 0–0.2)
BILIRUBIN URINE: NEGATIVE
BLOOD BANK COMMENT: NORMAL
BUN BLDV-MCNC: 10 MG/DL (ref 6–20)
BUN/CREAT BLD: NORMAL (ref 9–20)
CALCIUM SERPL-MCNC: 9.3 MG/DL (ref 8.6–10.4)
CASTS UA: ABNORMAL /LPF
CHLORIDE BLD-SCNC: 103 MMOL/L (ref 98–107)
CO2: 23 MMOL/L (ref 20–31)
COLOR: YELLOW
COMMENT UA: ABNORMAL
CREAT SERPL-MCNC: 0.66 MG/DL (ref 0.5–0.9)
CRYSTALS, UA: ABNORMAL /HPF
DIFFERENTIAL TYPE: ABNORMAL
EOSINOPHILS RELATIVE PERCENT: 5 % (ref 0–4)
EPITHELIAL CELLS UA: ABNORMAL /HPF
EXPIRATION DATE: NORMAL
GFR AFRICAN AMERICAN: >60 ML/MIN
GFR NON-AFRICAN AMERICAN: >60 ML/MIN
GFR SERPL CREATININE-BSD FRML MDRD: NORMAL ML/MIN/{1.73_M2}
GFR SERPL CREATININE-BSD FRML MDRD: NORMAL ML/MIN/{1.73_M2}
GLUCOSE BLD-MCNC: 90 MG/DL (ref 70–99)
GLUCOSE URINE: NEGATIVE
HCG QUANTITATIVE: <1 IU/L
HCT VFR BLD CALC: 38.6 % (ref 36–46)
HEMOGLOBIN: 13.1 G/DL (ref 12–16)
IMMATURE GRANULOCYTES: ABNORMAL %
INR BLD: 0.9
KETONES, URINE: NEGATIVE
LEUKOCYTE ESTERASE, URINE: ABNORMAL
LYMPHOCYTES # BLD: 21 % (ref 24–44)
MCH RBC QN AUTO: 29.2 PG (ref 26–34)
MCHC RBC AUTO-ENTMCNC: 34 G/DL (ref 31–37)
MCV RBC AUTO: 85.9 FL (ref 80–100)
MONOCYTES # BLD: 7 % (ref 1–7)
MUCUS: ABNORMAL
NITRITE, URINE: NEGATIVE
NRBC AUTOMATED: ABNORMAL PER 100 WBC
OTHER OBSERVATIONS UA: ABNORMAL
PARTIAL THROMBOPLASTIN TIME: 23.4 SEC (ref 24–36)
PDW BLD-RTO: 13.2 % (ref 11.5–14.9)
PH UA: 6 (ref 5–8)
PLATELET # BLD: 302 K/UL (ref 150–450)
PLATELET ESTIMATE: ABNORMAL
PMV BLD AUTO: 9.1 FL (ref 6–12)
POTASSIUM SERPL-SCNC: 4.5 MMOL/L (ref 3.7–5.3)
PROTEIN UA: NEGATIVE
PROTHROMBIN TIME: 11.5 SEC (ref 11.8–14.6)
RBC # BLD: 4.49 M/UL (ref 4–5.2)
RBC # BLD: ABNORMAL 10*6/UL
RBC UA: ABNORMAL /HPF
RENAL EPITHELIAL, UA: ABNORMAL /HPF
SEG NEUTROPHILS: 66 % (ref 36–66)
SEGMENTED NEUTROPHILS ABSOLUTE COUNT: 5.7 K/UL (ref 1.3–9.1)
SODIUM BLD-SCNC: 136 MMOL/L (ref 135–144)
SPECIFIC GRAVITY UA: 1.01 (ref 1–1.03)
TRICHOMONAS: ABNORMAL
TURBIDITY: CLEAR
URINE HGB: NEGATIVE
UROBILINOGEN, URINE: NORMAL
WBC # BLD: 8.5 K/UL (ref 3.5–11)
WBC # BLD: ABNORMAL 10*3/UL
WBC UA: ABNORMAL /HPF
YEAST: ABNORMAL

## 2020-07-13 PROCEDURE — 87086 URINE CULTURE/COLONY COUNT: CPT

## 2020-07-13 PROCEDURE — 36415 COLL VENOUS BLD VENIPUNCTURE: CPT

## 2020-07-13 PROCEDURE — 85730 THROMBOPLASTIN TIME PARTIAL: CPT

## 2020-07-13 PROCEDURE — 81001 URINALYSIS AUTO W/SCOPE: CPT

## 2020-07-13 PROCEDURE — 86900 BLOOD TYPING SEROLOGIC ABO: CPT

## 2020-07-13 PROCEDURE — 85025 COMPLETE CBC W/AUTO DIFF WBC: CPT

## 2020-07-13 PROCEDURE — 84702 CHORIONIC GONADOTROPIN TEST: CPT

## 2020-07-13 PROCEDURE — 86901 BLOOD TYPING SEROLOGIC RH(D): CPT

## 2020-07-13 PROCEDURE — 80048 BASIC METABOLIC PNL TOTAL CA: CPT

## 2020-07-13 PROCEDURE — 85610 PROTHROMBIN TIME: CPT

## 2020-07-13 PROCEDURE — 86850 RBC ANTIBODY SCREEN: CPT

## 2020-07-13 RX ORDER — M-VIT,TX,IRON,MINS/CALC/FOLIC 27MG-0.4MG
1 TABLET ORAL DAILY
COMMUNITY

## 2020-07-14 ENCOUNTER — ANESTHESIA EVENT (OUTPATIENT)
Dept: OPERATING ROOM | Age: 35
DRG: 743 | End: 2020-07-14
Payer: COMMERCIAL

## 2020-07-14 ENCOUNTER — TELEPHONE (OUTPATIENT)
Dept: OBGYN CLINIC | Age: 35
End: 2020-07-14

## 2020-07-14 LAB
CULTURE: NORMAL
Lab: NORMAL
SPECIMEN DESCRIPTION: NORMAL

## 2020-07-14 RX ORDER — SODIUM CHLORIDE, SODIUM LACTATE, POTASSIUM CHLORIDE, CALCIUM CHLORIDE 600; 310; 30; 20 MG/100ML; MG/100ML; MG/100ML; MG/100ML
INJECTION, SOLUTION INTRAVENOUS CONTINUOUS
Status: CANCELLED | OUTPATIENT
Start: 2020-07-14

## 2020-07-14 RX ORDER — SODIUM CHLORIDE 0.9 % (FLUSH) 0.9 %
10 SYRINGE (ML) INJECTION EVERY 12 HOURS SCHEDULED
Status: CANCELLED | OUTPATIENT
Start: 2020-07-14

## 2020-07-14 RX ORDER — SCOLOPAMINE TRANSDERMAL SYSTEM 1 MG/1
1 PATCH, EXTENDED RELEASE TRANSDERMAL
Status: CANCELLED | OUTPATIENT
Start: 2020-07-14 | End: 2020-07-17

## 2020-07-14 RX ORDER — LIDOCAINE HYDROCHLORIDE 10 MG/ML
1 INJECTION, SOLUTION EPIDURAL; INFILTRATION; INTRACAUDAL; PERINEURAL
Status: CANCELLED | OUTPATIENT
Start: 2020-07-14 | End: 2020-07-14

## 2020-07-14 RX ORDER — SODIUM CHLORIDE 0.9 % (FLUSH) 0.9 %
10 SYRINGE (ML) INJECTION PRN
Status: CANCELLED | OUTPATIENT
Start: 2020-07-14

## 2020-07-14 NOTE — TELEPHONE ENCOUNTER
----- Message from ONI Roldan NP sent at 7/13/2020 11:05 AM EDT -----  PTT and INR decreased  Please refer to PCP  Patient scheduled for TICO on 7/20/2020  Please have physician review

## 2020-07-14 NOTE — TELEPHONE ENCOUNTER
Patient aware accordingly. No follow up instructions from physician at this time. Will review with provider and notify patient if follow up is necessary. Patient admits to motrin use for pelvic pain, along with alcohol consumption over the weekend.

## 2020-07-16 ENCOUNTER — HOSPITAL ENCOUNTER (OUTPATIENT)
Dept: PREADMISSION TESTING | Age: 35
Setting detail: SPECIMEN
Discharge: HOME | End: 2020-07-20
Payer: COMMERCIAL

## 2020-07-16 PROCEDURE — U0004 COV-19 TEST NON-CDC HGH THRU: HCPCS

## 2020-07-17 LAB
SARS-COV-2, PCR: NOT DETECTED
SARS-COV-2, RAPID: NORMAL
SARS-COV-2: NORMAL
SOURCE: NORMAL

## 2020-07-18 ENCOUNTER — TELEPHONE (OUTPATIENT)
Dept: PRIMARY CARE CLINIC | Age: 35
End: 2020-07-18

## 2020-07-20 ENCOUNTER — ANESTHESIA (OUTPATIENT)
Dept: OPERATING ROOM | Age: 35
DRG: 743 | End: 2020-07-20
Payer: COMMERCIAL

## 2020-07-20 ENCOUNTER — HOSPITAL ENCOUNTER (INPATIENT)
Age: 35
LOS: 1 days | Discharge: HOME HEALTH | DRG: 743 | End: 2020-07-21
Attending: OBSTETRICS & GYNECOLOGY | Admitting: OBSTETRICS & GYNECOLOGY
Payer: COMMERCIAL

## 2020-07-20 VITALS — OXYGEN SATURATION: 100 % | SYSTOLIC BLOOD PRESSURE: 139 MMHG | DIASTOLIC BLOOD PRESSURE: 86 MMHG | TEMPERATURE: 96.4 F

## 2020-07-20 PROBLEM — Z90.710 S/P TOTAL ABDOMINAL HYSTERECTOMY: Status: ACTIVE | Noted: 2020-07-20

## 2020-07-20 PROBLEM — Z98.890 POST-OPERATIVE STATE: Status: ACTIVE | Noted: 2020-07-20

## 2020-07-20 LAB
BILIRUBIN URINE: NEGATIVE
COLOR: YELLOW
COMMENT UA: NORMAL
GLUCOSE URINE: NEGATIVE
KETONES, URINE: NEGATIVE
LEUKOCYTE ESTERASE, URINE: NEGATIVE
NITRITE, URINE: NEGATIVE
PH UA: 7.5 (ref 5–8)
PROTEIN UA: NEGATIVE
SPECIFIC GRAVITY UA: 1.01 (ref 1–1.03)
TURBIDITY: CLEAR
URINE HGB: NEGATIVE
UROBILINOGEN, URINE: NORMAL

## 2020-07-20 PROCEDURE — 7100000001 HC PACU RECOVERY - ADDTL 15 MIN: Performed by: OBSTETRICS & GYNECOLOGY

## 2020-07-20 PROCEDURE — 3600000002 HC SURGERY LEVEL 2 BASE: Performed by: OBSTETRICS & GYNECOLOGY

## 2020-07-20 PROCEDURE — 6360000002 HC RX W HCPCS: Performed by: STUDENT IN AN ORGANIZED HEALTH CARE EDUCATION/TRAINING PROGRAM

## 2020-07-20 PROCEDURE — 2500000003 HC RX 250 WO HCPCS

## 2020-07-20 PROCEDURE — 88302 TISSUE EXAM BY PATHOLOGIST: CPT

## 2020-07-20 PROCEDURE — 6360000002 HC RX W HCPCS

## 2020-07-20 PROCEDURE — 3700000001 HC ADD 15 MINUTES (ANESTHESIA): Performed by: OBSTETRICS & GYNECOLOGY

## 2020-07-20 PROCEDURE — 2720000010 HC SURG SUPPLY STERILE: Performed by: OBSTETRICS & GYNECOLOGY

## 2020-07-20 PROCEDURE — 6360000002 HC RX W HCPCS: Performed by: ANESTHESIOLOGY

## 2020-07-20 PROCEDURE — 2709999900 HC NON-CHARGEABLE SUPPLY: Performed by: OBSTETRICS & GYNECOLOGY

## 2020-07-20 PROCEDURE — 6360000002 HC RX W HCPCS: Performed by: OBSTETRICS & GYNECOLOGY

## 2020-07-20 PROCEDURE — 7100000000 HC PACU RECOVERY - FIRST 15 MIN: Performed by: OBSTETRICS & GYNECOLOGY

## 2020-07-20 PROCEDURE — 58150 TOTAL HYSTERECTOMY: CPT | Performed by: OBSTETRICS & GYNECOLOGY

## 2020-07-20 PROCEDURE — 57270 REPAIR OF BOWEL POUCH: CPT | Performed by: OBSTETRICS & GYNECOLOGY

## 2020-07-20 PROCEDURE — 1200000000 HC SEMI PRIVATE

## 2020-07-20 PROCEDURE — 3700000000 HC ANESTHESIA ATTENDED CARE: Performed by: OBSTETRICS & GYNECOLOGY

## 2020-07-20 PROCEDURE — 81003 URINALYSIS AUTO W/O SCOPE: CPT

## 2020-07-20 PROCEDURE — G0378 HOSPITAL OBSERVATION PER HR: HCPCS

## 2020-07-20 PROCEDURE — 97607 NEG PRS WND THR NDME<=50SQCM: CPT | Performed by: OBSTETRICS & GYNECOLOGY

## 2020-07-20 PROCEDURE — 6370000000 HC RX 637 (ALT 250 FOR IP): Performed by: STUDENT IN AN ORGANIZED HEALTH CARE EDUCATION/TRAINING PROGRAM

## 2020-07-20 PROCEDURE — 2580000003 HC RX 258: Performed by: STUDENT IN AN ORGANIZED HEALTH CARE EDUCATION/TRAINING PROGRAM

## 2020-07-20 PROCEDURE — 2580000003 HC RX 258: Performed by: OBSTETRICS & GYNECOLOGY

## 2020-07-20 PROCEDURE — 2580000003 HC RX 258: Performed by: ANESTHESIOLOGY

## 2020-07-20 PROCEDURE — 3600000012 HC SURGERY LEVEL 2 ADDTL 15MIN: Performed by: OBSTETRICS & GYNECOLOGY

## 2020-07-20 PROCEDURE — 6370000000 HC RX 637 (ALT 250 FOR IP): Performed by: ANESTHESIOLOGY

## 2020-07-20 PROCEDURE — 88307 TISSUE EXAM BY PATHOLOGIST: CPT

## 2020-07-20 RX ORDER — MONTELUKAST SODIUM 10 MG/1
10 TABLET ORAL NIGHTLY PRN
Status: DISCONTINUED | OUTPATIENT
Start: 2020-07-20 | End: 2020-07-21 | Stop reason: HOSPADM

## 2020-07-20 RX ORDER — SODIUM CHLORIDE 9 MG/ML
INJECTION, SOLUTION INTRAVENOUS CONTINUOUS
Status: DISCONTINUED | OUTPATIENT
Start: 2020-07-20 | End: 2020-07-21 | Stop reason: HOSPADM

## 2020-07-20 RX ORDER — SODIUM CHLORIDE 0.9 % (FLUSH) 0.9 %
10 SYRINGE (ML) INJECTION EVERY 12 HOURS SCHEDULED
Status: DISCONTINUED | OUTPATIENT
Start: 2020-07-20 | End: 2020-07-20 | Stop reason: HOSPADM

## 2020-07-20 RX ORDER — ONDANSETRON 2 MG/ML
4 INJECTION INTRAMUSCULAR; INTRAVENOUS EVERY 6 HOURS PRN
Status: DISCONTINUED | OUTPATIENT
Start: 2020-07-20 | End: 2020-07-20

## 2020-07-20 RX ORDER — ONDANSETRON 2 MG/ML
4 INJECTION INTRAMUSCULAR; INTRAVENOUS EVERY 6 HOURS PRN
Status: DISCONTINUED | OUTPATIENT
Start: 2020-07-20 | End: 2020-07-21 | Stop reason: HOSPADM

## 2020-07-20 RX ORDER — ACETAMINOPHEN 325 MG/1
650 TABLET ORAL EVERY 4 HOURS PRN
Status: DISCONTINUED | OUTPATIENT
Start: 2020-07-20 | End: 2020-07-21 | Stop reason: HOSPADM

## 2020-07-20 RX ORDER — SODIUM CHLORIDE 0.9 % (FLUSH) 0.9 %
10 SYRINGE (ML) INJECTION PRN
Status: DISCONTINUED | OUTPATIENT
Start: 2020-07-20 | End: 2020-07-20 | Stop reason: HOSPADM

## 2020-07-20 RX ORDER — GLYCOPYRROLATE 1 MG/5 ML
SYRINGE (ML) INTRAVENOUS PRN
Status: DISCONTINUED | OUTPATIENT
Start: 2020-07-20 | End: 2020-07-20 | Stop reason: SDUPTHER

## 2020-07-20 RX ORDER — DIPHENHYDRAMINE HCL 25 MG
25 TABLET ORAL EVERY 6 HOURS PRN
Status: DISCONTINUED | OUTPATIENT
Start: 2020-07-20 | End: 2020-07-20

## 2020-07-20 RX ORDER — SCOLOPAMINE TRANSDERMAL SYSTEM 1 MG/1
1 PATCH, EXTENDED RELEASE TRANSDERMAL
Status: DISCONTINUED | OUTPATIENT
Start: 2020-07-20 | End: 2020-07-20

## 2020-07-20 RX ORDER — SODIUM CHLORIDE 0.9 % (FLUSH) 0.9 %
10 SYRINGE (ML) INJECTION PRN
Status: DISCONTINUED | OUTPATIENT
Start: 2020-07-20 | End: 2020-07-21 | Stop reason: HOSPADM

## 2020-07-20 RX ORDER — HEPARIN SODIUM 5000 [USP'U]/ML
5000 INJECTION, SOLUTION INTRAVENOUS; SUBCUTANEOUS EVERY 12 HOURS
Status: DISCONTINUED | OUTPATIENT
Start: 2020-07-20 | End: 2020-07-21 | Stop reason: HOSPADM

## 2020-07-20 RX ORDER — KETOROLAC TROMETHAMINE 30 MG/ML
30 INJECTION, SOLUTION INTRAMUSCULAR; INTRAVENOUS EVERY 6 HOURS PRN
Status: DISCONTINUED | OUTPATIENT
Start: 2020-07-20 | End: 2020-07-20

## 2020-07-20 RX ORDER — OXYCODONE HYDROCHLORIDE AND ACETAMINOPHEN 5; 325 MG/1; MG/1
2 TABLET ORAL EVERY 4 HOURS PRN
Status: DISCONTINUED | OUTPATIENT
Start: 2020-07-20 | End: 2020-07-21 | Stop reason: HOSPADM

## 2020-07-20 RX ORDER — SODIUM CHLORIDE 0.9 % (FLUSH) 0.9 %
10 SYRINGE (ML) INJECTION EVERY 12 HOURS SCHEDULED
Status: DISCONTINUED | OUTPATIENT
Start: 2020-07-20 | End: 2020-07-21 | Stop reason: HOSPADM

## 2020-07-20 RX ORDER — SODIUM CHLORIDE, SODIUM LACTATE, POTASSIUM CHLORIDE, CALCIUM CHLORIDE 600; 310; 30; 20 MG/100ML; MG/100ML; MG/100ML; MG/100ML
INJECTION, SOLUTION INTRAVENOUS CONTINUOUS
Status: DISCONTINUED | OUTPATIENT
Start: 2020-07-20 | End: 2020-07-20

## 2020-07-20 RX ORDER — ONDANSETRON 2 MG/ML
INJECTION INTRAMUSCULAR; INTRAVENOUS PRN
Status: DISCONTINUED | OUTPATIENT
Start: 2020-07-20 | End: 2020-07-20 | Stop reason: SDUPTHER

## 2020-07-20 RX ORDER — PROPOFOL 10 MG/ML
INJECTION, EMULSION INTRAVENOUS PRN
Status: DISCONTINUED | OUTPATIENT
Start: 2020-07-20 | End: 2020-07-20 | Stop reason: SDUPTHER

## 2020-07-20 RX ORDER — LIDOCAINE HYDROCHLORIDE 10 MG/ML
1 INJECTION, SOLUTION EPIDURAL; INFILTRATION; INTRACAUDAL; PERINEURAL
Status: DISCONTINUED | OUTPATIENT
Start: 2020-07-20 | End: 2020-07-20 | Stop reason: HOSPADM

## 2020-07-20 RX ORDER — MORPHINE SULFATE 1 MG/ML
INJECTION, SOLUTION EPIDURAL; INTRATHECAL; INTRAVENOUS PRN
Status: DISCONTINUED | OUTPATIENT
Start: 2020-07-20 | End: 2020-07-20 | Stop reason: SDUPTHER

## 2020-07-20 RX ORDER — IBUPROFEN 600 MG/1
600 TABLET ORAL EVERY 6 HOURS PRN
Status: DISCONTINUED | OUTPATIENT
Start: 2020-07-20 | End: 2020-07-21 | Stop reason: HOSPADM

## 2020-07-20 RX ORDER — DOCUSATE SODIUM 100 MG/1
100 CAPSULE, LIQUID FILLED ORAL 2 TIMES DAILY
Status: DISCONTINUED | OUTPATIENT
Start: 2020-07-20 | End: 2020-07-21 | Stop reason: HOSPADM

## 2020-07-20 RX ORDER — NEOSTIGMINE METHYLSULFATE 5 MG/5 ML
SYRINGE (ML) INTRAVENOUS PRN
Status: DISCONTINUED | OUTPATIENT
Start: 2020-07-20 | End: 2020-07-20 | Stop reason: SDUPTHER

## 2020-07-20 RX ORDER — ONDANSETRON 4 MG/1
4 TABLET, FILM COATED ORAL EVERY 8 HOURS PRN
Status: DISCONTINUED | OUTPATIENT
Start: 2020-07-20 | End: 2020-07-21 | Stop reason: HOSPADM

## 2020-07-20 RX ORDER — ONDANSETRON 2 MG/ML
4 INJECTION INTRAMUSCULAR; INTRAVENOUS
Status: COMPLETED | OUTPATIENT
Start: 2020-07-20 | End: 2020-07-20

## 2020-07-20 RX ORDER — MIDAZOLAM HYDROCHLORIDE 1 MG/ML
INJECTION INTRAMUSCULAR; INTRAVENOUS PRN
Status: DISCONTINUED | OUTPATIENT
Start: 2020-07-20 | End: 2020-07-20 | Stop reason: SDUPTHER

## 2020-07-20 RX ORDER — FLUOXETINE HYDROCHLORIDE 20 MG/1
20 CAPSULE ORAL DAILY
Status: DISCONTINUED | OUTPATIENT
Start: 2020-07-21 | End: 2020-07-21 | Stop reason: HOSPADM

## 2020-07-20 RX ORDER — PANTOPRAZOLE SODIUM 40 MG/1
40 TABLET, DELAYED RELEASE ORAL DAILY PRN
Status: DISCONTINUED | OUTPATIENT
Start: 2020-07-20 | End: 2020-07-21 | Stop reason: HOSPADM

## 2020-07-20 RX ORDER — NALOXONE HYDROCHLORIDE 0.4 MG/ML
0.4 INJECTION, SOLUTION INTRAMUSCULAR; INTRAVENOUS; SUBCUTANEOUS PRN
Status: DISCONTINUED | OUTPATIENT
Start: 2020-07-20 | End: 2020-07-20 | Stop reason: HOSPADM

## 2020-07-20 RX ORDER — ROCURONIUM BROMIDE 10 MG/ML
INJECTION, SOLUTION INTRAVENOUS PRN
Status: DISCONTINUED | OUTPATIENT
Start: 2020-07-20 | End: 2020-07-20 | Stop reason: SDUPTHER

## 2020-07-20 RX ORDER — LIDOCAINE HYDROCHLORIDE 10 MG/ML
INJECTION, SOLUTION EPIDURAL; INFILTRATION; INTRACAUDAL; PERINEURAL PRN
Status: DISCONTINUED | OUTPATIENT
Start: 2020-07-20 | End: 2020-07-20 | Stop reason: SDUPTHER

## 2020-07-20 RX ORDER — EPHEDRINE SULFATE/0.9% NACL/PF 50 MG/5 ML
SYRINGE (ML) INTRAVENOUS PRN
Status: DISCONTINUED | OUTPATIENT
Start: 2020-07-20 | End: 2020-07-20 | Stop reason: SDUPTHER

## 2020-07-20 RX ORDER — CETIRIZINE HYDROCHLORIDE 10 MG/1
10 TABLET ORAL DAILY
Status: DISCONTINUED | OUTPATIENT
Start: 2020-07-20 | End: 2020-07-21 | Stop reason: HOSPADM

## 2020-07-20 RX ORDER — DEXAMETHASONE SODIUM PHOSPHATE 4 MG/ML
INJECTION, SOLUTION INTRA-ARTICULAR; INTRALESIONAL; INTRAMUSCULAR; INTRAVENOUS; SOFT TISSUE PRN
Status: DISCONTINUED | OUTPATIENT
Start: 2020-07-20 | End: 2020-07-20 | Stop reason: SDUPTHER

## 2020-07-20 RX ORDER — MORPHINE SULFATE 2 MG/ML
2 INJECTION, SOLUTION INTRAMUSCULAR; INTRAVENOUS EVERY 5 MIN PRN
Status: DISCONTINUED | OUTPATIENT
Start: 2020-07-20 | End: 2020-07-20 | Stop reason: HOSPADM

## 2020-07-20 RX ORDER — DIPHENHYDRAMINE HCL 25 MG
50 TABLET ORAL EVERY 6 HOURS PRN
Status: DISCONTINUED | OUTPATIENT
Start: 2020-07-20 | End: 2020-07-21 | Stop reason: HOSPADM

## 2020-07-20 RX ORDER — LABETALOL 20 MG/4 ML (5 MG/ML) INTRAVENOUS SYRINGE
5 EVERY 10 MIN PRN
Status: DISCONTINUED | OUTPATIENT
Start: 2020-07-20 | End: 2020-07-20 | Stop reason: HOSPADM

## 2020-07-20 RX ORDER — PROMETHAZINE HYDROCHLORIDE 12.5 MG/1
12.5 TABLET ORAL EVERY 6 HOURS PRN
Status: DISCONTINUED | OUTPATIENT
Start: 2020-07-20 | End: 2020-07-20

## 2020-07-20 RX ORDER — MAGNESIUM HYDROXIDE/ALUMINUM HYDROXICE/SIMETHICONE 120; 1200; 1200 MG/30ML; MG/30ML; MG/30ML
30 SUSPENSION ORAL EVERY 6 HOURS PRN
Status: DISCONTINUED | OUTPATIENT
Start: 2020-07-20 | End: 2020-07-21 | Stop reason: HOSPADM

## 2020-07-20 RX ORDER — FENTANYL CITRATE 50 UG/ML
INJECTION, SOLUTION INTRAMUSCULAR; INTRAVENOUS PRN
Status: DISCONTINUED | OUTPATIENT
Start: 2020-07-20 | End: 2020-07-20 | Stop reason: SDUPTHER

## 2020-07-20 RX ORDER — OXYCODONE HYDROCHLORIDE AND ACETAMINOPHEN 5; 325 MG/1; MG/1
1 TABLET ORAL EVERY 4 HOURS PRN
Status: DISCONTINUED | OUTPATIENT
Start: 2020-07-20 | End: 2020-07-21 | Stop reason: HOSPADM

## 2020-07-20 RX ORDER — CALCIUM CARBONATE 200(500)MG
500 TABLET,CHEWABLE ORAL 3 TIMES DAILY PRN
Status: DISCONTINUED | OUTPATIENT
Start: 2020-07-20 | End: 2020-07-21 | Stop reason: HOSPADM

## 2020-07-20 RX ORDER — NALBUPHINE HCL 10 MG/ML
5 AMPUL (ML) INJECTION EVERY 4 HOURS PRN
Status: DISCONTINUED | OUTPATIENT
Start: 2020-07-20 | End: 2020-07-20 | Stop reason: HOSPADM

## 2020-07-20 RX ORDER — MEPERIDINE HYDROCHLORIDE 25 MG/ML
12.5 INJECTION INTRAMUSCULAR; INTRAVENOUS; SUBCUTANEOUS EVERY 5 MIN PRN
Status: DISCONTINUED | OUTPATIENT
Start: 2020-07-20 | End: 2020-07-20 | Stop reason: HOSPADM

## 2020-07-20 RX ORDER — ONDANSETRON 2 MG/ML
4 INJECTION INTRAMUSCULAR; INTRAVENOUS EVERY 6 HOURS PRN
Status: DISCONTINUED | OUTPATIENT
Start: 2020-07-20 | End: 2020-07-20 | Stop reason: HOSPADM

## 2020-07-20 RX ORDER — DIPHENHYDRAMINE HYDROCHLORIDE 50 MG/ML
12.5 INJECTION INTRAMUSCULAR; INTRAVENOUS
Status: COMPLETED | OUTPATIENT
Start: 2020-07-20 | End: 2020-07-20

## 2020-07-20 RX ADMIN — SODIUM CHLORIDE, POTASSIUM CHLORIDE, SODIUM LACTATE AND CALCIUM CHLORIDE: 600; 310; 30; 20 INJECTION, SOLUTION INTRAVENOUS at 10:53

## 2020-07-20 RX ADMIN — HEPARIN SODIUM 5000 UNITS: 5000 INJECTION INTRAVENOUS; SUBCUTANEOUS at 23:18

## 2020-07-20 RX ADMIN — MORPHINE SULFATE 3 MG: 1 INJECTION EPIDURAL; INTRATHECAL; INTRAVENOUS at 12:36

## 2020-07-20 RX ADMIN — Medication 0.6 MG: at 12:19

## 2020-07-20 RX ADMIN — Medication 3 MG: at 12:19

## 2020-07-20 RX ADMIN — HYDROMORPHONE HYDROCHLORIDE 0.5 MG: 1 INJECTION, SOLUTION INTRAMUSCULAR; INTRAVENOUS; SUBCUTANEOUS at 12:55

## 2020-07-20 RX ADMIN — FENTANYL CITRATE 25 MCG: 50 INJECTION, SOLUTION INTRAMUSCULAR; INTRAVENOUS at 11:12

## 2020-07-20 RX ADMIN — MORPHINE SULFATE 3 MG: 1 INJECTION EPIDURAL; INTRATHECAL; INTRAVENOUS at 12:39

## 2020-07-20 RX ADMIN — FENTANYL CITRATE 25 MCG: 50 INJECTION, SOLUTION INTRAMUSCULAR; INTRAVENOUS at 11:50

## 2020-07-20 RX ADMIN — ROCURONIUM BROMIDE 10 MG: 10 INJECTION, SOLUTION INTRAVENOUS at 11:12

## 2020-07-20 RX ADMIN — SODIUM CHLORIDE, POTASSIUM CHLORIDE, SODIUM LACTATE AND CALCIUM CHLORIDE: 600; 310; 30; 20 INJECTION, SOLUTION INTRAVENOUS at 10:04

## 2020-07-20 RX ADMIN — MORPHINE SULFATE 3 MG: 1 INJECTION EPIDURAL; INTRATHECAL; INTRAVENOUS at 11:53

## 2020-07-20 RX ADMIN — ROCURONIUM BROMIDE 40 MG: 10 INJECTION, SOLUTION INTRAVENOUS at 10:16

## 2020-07-20 RX ADMIN — DIPHENHYDRAMINE HCL 50 MG: 25 TABLET ORAL at 18:48

## 2020-07-20 RX ADMIN — CEFAZOLIN 2 G: 1 INJECTION, POWDER, FOR SOLUTION INTRAMUSCULAR; INTRAVENOUS at 18:47

## 2020-07-20 RX ADMIN — Medication 0.2 MG: at 10:58

## 2020-07-20 RX ADMIN — DEXAMETHASONE SODIUM PHOSPHATE 4 MG: 4 INJECTION, SOLUTION INTRA-ARTICULAR; INTRALESIONAL; INTRAMUSCULAR; INTRAVENOUS; SOFT TISSUE at 10:27

## 2020-07-20 RX ADMIN — Medication 10 MG: at 11:00

## 2020-07-20 RX ADMIN — FENTANYL CITRATE 50 MCG: 50 INJECTION, SOLUTION INTRAMUSCULAR; INTRAVENOUS at 10:46

## 2020-07-20 RX ADMIN — LIDOCAINE HYDROCHLORIDE 50 MG: 10 INJECTION, SOLUTION EPIDURAL; INFILTRATION; INTRACAUDAL; PERINEURAL at 10:16

## 2020-07-20 RX ADMIN — HYDROMORPHONE HYDROCHLORIDE 0.5 MG: 1 INJECTION, SOLUTION INTRAMUSCULAR; INTRAVENOUS; SUBCUTANEOUS at 13:03

## 2020-07-20 RX ADMIN — SODIUM CHLORIDE, POTASSIUM CHLORIDE, SODIUM LACTATE AND CALCIUM CHLORIDE: 600; 310; 30; 20 INJECTION, SOLUTION INTRAVENOUS at 09:35

## 2020-07-20 RX ADMIN — SODIUM CHLORIDE: 9 INJECTION, SOLUTION INTRAVENOUS at 14:09

## 2020-07-20 RX ADMIN — OXYCODONE AND ACETAMINOPHEN 2 TABLET: 5; 325 TABLET ORAL at 14:37

## 2020-07-20 RX ADMIN — MIDAZOLAM 2 MG: 1 INJECTION INTRAMUSCULAR; INTRAVENOUS at 10:04

## 2020-07-20 RX ADMIN — MORPHINE SULFATE 0.2 MG: 1 INJECTION EPIDURAL; INTRATHECAL; INTRAVENOUS at 10:14

## 2020-07-20 RX ADMIN — ONDANSETRON 4 MG: 2 INJECTION INTRAMUSCULAR; INTRAVENOUS at 12:56

## 2020-07-20 RX ADMIN — PROPOFOL 150 MG: 10 INJECTION, EMULSION INTRAVENOUS at 10:16

## 2020-07-20 RX ADMIN — ONDANSETRON 4 MG: 2 INJECTION INTRAMUSCULAR; INTRAVENOUS at 10:27

## 2020-07-20 RX ADMIN — KETOROLAC TROMETHAMINE 30 MG: 30 INJECTION, SOLUTION INTRAMUSCULAR at 14:07

## 2020-07-20 RX ADMIN — DIPHENHYDRAMINE HYDROCHLORIDE 12.5 MG: 50 INJECTION, SOLUTION INTRAMUSCULAR; INTRAVENOUS at 12:45

## 2020-07-20 RX ADMIN — CEFAZOLIN 2 G: 10 INJECTION, POWDER, FOR SOLUTION INTRAVENOUS at 10:24

## 2020-07-20 RX ADMIN — OXYCODONE AND ACETAMINOPHEN 2 TABLET: 5; 325 TABLET ORAL at 18:49

## 2020-07-20 ASSESSMENT — PULMONARY FUNCTION TESTS
PIF_VALUE: 20
PIF_VALUE: 16
PIF_VALUE: 16
PIF_VALUE: 19
PIF_VALUE: 20
PIF_VALUE: 16
PIF_VALUE: 20
PIF_VALUE: 20
PIF_VALUE: 16
PIF_VALUE: 18
PIF_VALUE: 18
PIF_VALUE: 0
PIF_VALUE: 16
PIF_VALUE: 20
PIF_VALUE: 1
PIF_VALUE: 21
PIF_VALUE: 17
PIF_VALUE: 0
PIF_VALUE: 16
PIF_VALUE: 18
PIF_VALUE: 2
PIF_VALUE: 16
PIF_VALUE: 18
PIF_VALUE: 20
PIF_VALUE: 16
PIF_VALUE: 0
PIF_VALUE: 3
PIF_VALUE: 2
PIF_VALUE: 17
PIF_VALUE: 19
PIF_VALUE: 16
PIF_VALUE: 0
PIF_VALUE: 20
PIF_VALUE: 0
PIF_VALUE: 0
PIF_VALUE: 18
PIF_VALUE: 23
PIF_VALUE: 21
PIF_VALUE: 19
PIF_VALUE: 20
PIF_VALUE: 20
PIF_VALUE: 0
PIF_VALUE: 21
PIF_VALUE: 16
PIF_VALUE: 25
PIF_VALUE: 19
PIF_VALUE: 20
PIF_VALUE: 0
PIF_VALUE: 1
PIF_VALUE: 1
PIF_VALUE: 16
PIF_VALUE: 20
PIF_VALUE: 27
PIF_VALUE: 16
PIF_VALUE: 19
PIF_VALUE: 0
PIF_VALUE: 16
PIF_VALUE: 20
PIF_VALUE: 21
PIF_VALUE: 16
PIF_VALUE: 19
PIF_VALUE: 21
PIF_VALUE: 21
PIF_VALUE: 1
PIF_VALUE: 16
PIF_VALUE: 16
PIF_VALUE: 17
PIF_VALUE: 0
PIF_VALUE: 0
PIF_VALUE: 21
PIF_VALUE: 16
PIF_VALUE: 20
PIF_VALUE: 21
PIF_VALUE: 0
PIF_VALUE: 0
PIF_VALUE: 16
PIF_VALUE: 16
PIF_VALUE: 21
PIF_VALUE: 18
PIF_VALUE: 0
PIF_VALUE: 16
PIF_VALUE: 17
PIF_VALUE: 20
PIF_VALUE: 16
PIF_VALUE: 16
PIF_VALUE: 19
PIF_VALUE: 20
PIF_VALUE: 21
PIF_VALUE: 20
PIF_VALUE: 21
PIF_VALUE: 16
PIF_VALUE: 20
PIF_VALUE: 17
PIF_VALUE: 19
PIF_VALUE: 20
PIF_VALUE: 16
PIF_VALUE: 20
PIF_VALUE: 17
PIF_VALUE: 20
PIF_VALUE: 20
PIF_VALUE: 21
PIF_VALUE: 17
PIF_VALUE: 20
PIF_VALUE: 16
PIF_VALUE: 17
PIF_VALUE: 18
PIF_VALUE: 0
PIF_VALUE: 16
PIF_VALUE: 16
PIF_VALUE: 20
PIF_VALUE: 20
PIF_VALUE: 16
PIF_VALUE: 21
PIF_VALUE: 20
PIF_VALUE: 18
PIF_VALUE: 20
PIF_VALUE: 20
PIF_VALUE: 0
PIF_VALUE: 18
PIF_VALUE: 16
PIF_VALUE: 21
PIF_VALUE: 21
PIF_VALUE: 16
PIF_VALUE: 16
PIF_VALUE: 18
PIF_VALUE: 20
PIF_VALUE: 16
PIF_VALUE: 16
PIF_VALUE: 21
PIF_VALUE: 0
PIF_VALUE: 16
PIF_VALUE: 21
PIF_VALUE: 17
PIF_VALUE: 0
PIF_VALUE: 18
PIF_VALUE: 0
PIF_VALUE: 0
PIF_VALUE: 18
PIF_VALUE: 1
PIF_VALUE: 21
PIF_VALUE: 17
PIF_VALUE: 16
PIF_VALUE: 16
PIF_VALUE: 18
PIF_VALUE: 17
PIF_VALUE: 16
PIF_VALUE: 16
PIF_VALUE: 17
PIF_VALUE: 16
PIF_VALUE: 21

## 2020-07-20 ASSESSMENT — PAIN SCALES - GENERAL
PAINLEVEL_OUTOF10: 9
PAINLEVEL_OUTOF10: 8
PAINLEVEL_OUTOF10: 6
PAINLEVEL_OUTOF10: 8
PAINLEVEL_OUTOF10: 0
PAINLEVEL_OUTOF10: 2
PAINLEVEL_OUTOF10: 9

## 2020-07-20 ASSESSMENT — ENCOUNTER SYMPTOMS
SHORTNESS OF BREATH: 0
STRIDOR: 0

## 2020-07-20 ASSESSMENT — LIFESTYLE VARIABLES: SMOKING_STATUS: 0

## 2020-07-20 ASSESSMENT — PAIN - FUNCTIONAL ASSESSMENT: PAIN_FUNCTIONAL_ASSESSMENT: 0-10

## 2020-07-20 NOTE — INTERVAL H&P NOTE
Gynecologic Surgery Pre-Operative Attestation Note:        [facility]: [facility]  Date: 2020  Time: 8:38 AM      Patient Name: Alethea Pedroza  Patient : 1985  Room/Bed: 250 [facility] OR Pool/NONE  Admission Date/Time: 2020  8:03 AM  MRN #: [id number]  [facility] #: [id number]            Attending Physician Statement  I have discussed and reviewed the care of Alethea Pedroza, including pertinent history and exam findings. I have reviewed the note in the electronic medical record. I have seen and examined the patient in the pre-op holding area and the key elements of all parts of the encounter have been performed/reviewed by me . This was completed more than 15 minutes prior to the scheduled surgical start time per the new start time policy. I agree with the assessment, plan and orders as documented. The procedure risks and complications were discussed as well as the possibility of the need for a second surgery and incomplete removal of abnormal tissue. The patient voiced understanding. The Patient is undergoing a  hysterectomy with an abdominal approach and had discussed with her the possibility of a Supra-Cervical approach (leaving the cervix intact). There were no vitals filed for this visit. Hospital Outpatient Visit on 2020   Component Date Value Ref Range Status    Specimen Description 2020 . CLEAN CATCH URINE   Final    Special Requests 2020 NOT REPORTED   Final    Culture 2020 NO SIGNIFICANT GROWTH   Final    PTT 2020 23.4* 24.0 - 36.0 sec Final    Comment:       IV Heparin Therapy Range:      62.0-94.0            Glucose 2020 90  70 - 99 mg/dL Final    BUN 2020 10  6 - 20 mg/dL Final    CREATININE 2020 0.66  0.50 - 0.90 mg/dL Final    Bun/Cre Ratio 2020 NOT REPORTED  9 - 20 Final    Calcium 2020 9.3  8.6 - 10.4 mg/dL Final    Sodium 2020 136  135 - 144 mmol/L Final    Potassium 2020 4.5 3.7 - 5.3 mmol/L Final    Chloride 07/13/2020 103  98 - 107 mmol/L Final    CO2 07/13/2020 23  20 - 31 mmol/L Final    Anion Gap 07/13/2020 10  9 - 17 mmol/L Final    GFR Non- 07/13/2020 >60  >60 mL/min Final    GFR  07/13/2020 >60  >60 mL/min Final    GFR Comment 07/13/2020        Final    Comment: Average GFR for 30-36 years old:   80 mL/min/1.73sq m  Chronic Kidney Disease:   <60 mL/min/1.73sq m  Kidney failure:   <15 mL/min/1.73sq m              eGFR calculated using average adult body mass.  Additional eGFR calculator available at:        [email or web address]            GFR Staging 07/13/2020 NOT REPORTED   Final    WBC 07/13/2020 8.5  3.5 - 11.0 k/uL Final    RBC 07/13/2020 4.49  4.0 - 5.2 m/uL Final    Hemoglobin 07/13/2020 13.1  12.0 - 16.0 g/dL Final    Hematocrit 07/13/2020 38.6  36 - 46 % Final    MCV 07/13/2020 85.9  80 - 100 fL Final    MCH 07/13/2020 29.2  26 - 34 pg Final    MCHC 07/13/2020 34.0  31 - 37 g/dL Final    RDW 07/13/2020 13.2  11.5 - 14.9 % Final    Platelets 20/19/8042 302  150 - 450 k/uL Final    MPV 07/13/2020 9.1  6.0 - 12.0 fL Final    NRBC Automated 07/13/2020 NOT REPORTED  per 100 WBC Final    Differential Type 07/13/2020 NOT REPORTED   Final    Seg Neutrophils 07/13/2020 66  36 - 66 % Final    Lymphocytes 07/13/2020 21* 24 - 44 % Final    Monocytes 07/13/2020 7  1 - 7 % Final    Eosinophils % 07/13/2020 5* 0 - 4 % Final    Basophils 07/13/2020 1  0 - 2 % Final    Immature Granulocytes 07/13/2020 NOT REPORTED  0 % Final    Segs Absolute 07/13/2020 5.70  1.3 - 9.1 k/uL Final    Absolute Lymph # 07/13/2020 1.80  1.0 - 4.8 k/uL Final    Absolute Mono # 07/13/2020 0.60  0.1 - 1.3 k/uL Final    Absolute Eos # 07/13/2020 0.40  0.0 - 0.4 k/uL Final    Basophils Absolute 07/13/2020 0.10  0.0 - 0.2 k/uL Final    Absolute Immature Granulocyte 07/13/2020 NOT REPORTED  0.00 - 0.30 k/uL Final    WBC Morphology 07/13/2020 NOT REPORTED   Final    RBC Morphology 07/13/2020 NOT REPORTED   Final    Platelet Estimate 91/94/2759 NOT REPORTED   Final    Protime 07/13/2020 11.5* 11.8 - 14.6 sec Final    INR 07/13/2020 0.9   Final    Comment:       Non-therapeutic Range:     INR = 0.9-1.2  Therapeutic Range: Moderate Anticoagulant Intensity:     INR = 2.0-3.0   High Anticoagulant Intensity:     INR = 2.5-3.5            hCG Quant 07/13/2020 <1  <5 IU/L Final    Comment:    Non-preg premeno   <=5  Postmeno           <=8  Male               <=3  If HCG results do not concur with clinical observations, additional testing to confirm   results is recommended. Elevated results not associated with pregnancy may be found in patients with other diseases   such as tumors of the germ cells (testis, ovaries, etc.), bladder, pancreas, stomach, lungs,   and liver.       Expiration Date 07/13/2020 07/23/2020,2359   Final    Arm Band Number 07/13/2020 [id number]   Final    ABO/Rh 07/13/2020 AB POSITIVE   Final    Antibody Screen 07/13/2020 NEGATIVE   Final    Blood Bank Comment 07/13/2020    Final                    Value:PT'S ABORH CONFIRMED AB VMR:[id number]  Results Verified      Color, UA 07/13/2020 YELLOW  YELLOW Final    Turbidity UA 07/13/2020 CLEAR  CLEAR Final    Glucose, Ur 07/13/2020 NEGATIVE  NEGATIVE Final    Bilirubin Urine 07/13/2020 NEGATIVE  NEGATIVE Final    Ketones, Urine 07/13/2020 NEGATIVE  NEGATIVE Final    Specific Waterflow, UA 07/13/2020 1.006  1.000 - 1.030 Final    Urine Hgb 07/13/2020 NEGATIVE  NEGATIVE Final    pH, UA 07/13/2020 6.0  5.0 - 8.0 Final    Protein, UA 07/13/2020 NEGATIVE  NEGATIVE Final    Urobilinogen, Urine 07/13/2020 Normal  Normal Final    Nitrite, Urine 07/13/2020 NEGATIVE  NEGATIVE Final    Leukocyte Esterase, Urine 07/13/2020 SMALL* NEGATIVE Final    Urinalysis Comments 07/13/2020 NOT REPORTED   Final    - 07/13/2020        Final    WBC, UA 07/13/2020 0 TO 2  /HPF Final    RBC, UA 07/13/2020 0 TO 2  /HPF Final    Casts UA 07/13/2020 NOT REPORTED  /LPF Final    Crystals, UA 07/13/2020 NOT REPORTED  None /HPF Final    Epithelial Cells UA 07/13/2020 0 TO 2  /HPF Final    Renal Epithelial, UA 07/13/2020 NOT REPORTED  0 /HPF Final    Bacteria, UA 07/13/2020 FEW* None Final    Mucus, UA 07/13/2020 NOT REPORTED  None Final    Trichomonas, UA 07/13/2020 NOT REPORTED  None Final    Amorphous, UA 07/13/2020 1+* None Final    Other Observations UA 07/13/2020 NOT REPORTED  NOT REQ. Final    Yeast, UA 07/13/2020 NOT REPORTED  None Final    SARS-CoV-2 07/16/2020        Final    SARS-CoV-2, Rapid 07/16/2020        Final    Source 07/16/2020 . NASOPHARYNGEAL SWAB   Final    SARS-CoV-2, PCR 07/16/2020 Not Detected  Not Detected Final    Comment: (NOTE)  The Aptima SARS-CoV-2 assay is a nucleic acid amplification test  intended for the qualitative detection of RNA from SARS-CoV-2 isolated  and purified from nasopharyngeal (NP), nasal and oropharyngeal (OP)  swab  specimens from patients with signs and symptoms of infection who are  suspected of COVID-19. Results are for the identification of SARS-CoV-2 RNA. The SARS-CoV-2  RNA  is generally detectable in nasopharyngeal and oropharyngeal swabs  during  the acute phase of infection. The Aptima SARS-CoV-2 Assay on the [employer] Fusion system  is  intended for use by laboratory personnel specifically instructed and  trained in the operation of the [place] [employer] system. The  Aptima SARS-CoV-2 assay is only for use under the Food and Drug  Administration Emergency Use Authorization. Testing is limited to  laboratories certified under the Clinical Laboratory Improvement  Amendments of 1988 (CLIA), 42 U. S.C. U¿½018B, to perform high  complexity  tests. Not Det                           ected:  Not detected does not preclude SARS-CoV-2 infection and should not be  used as the sole basis for patient management decisions. Not detected  results must be combined with clinical observations, patient history,  and epidemiological information. The above 1 analytes were performed by [facility]  [address] [place] [address].,[place] [address]     ]      The patient was counseled at length about the risks of lashawn Covid-19 in the emerson-operative and post-operative states including the recovery window of their procedure. The patient was made aware that lashawn Covid-19 after a surgical procedure may worsen their prognosis for recovering from the virus and lend to a higher morbidity and or mortality risk. The patient was given the options of postponing their procedure. All of the risks, benefits, and alternatives were discussed. The patient  does wish to proceed with the procedure. Assessment:       Diagnosis Orders   1. Dysmenorrhea      2. Subserous leiomyoma of uterus posterior 5.1 x 5.3 x 4.5 cm      3. Menorrhagia with regular cycle      4. Dyspareunia in female      5. Pelvic pain in female                  Patient Active Problem List     Diagnosis Date Noted    Dysmenorrhea 05/11/2020       Priority: High    Subserous leiomyoma of uterus posterior 5.1 x 5.3 x 4.5 cm 05/11/2020       Priority: High    Dyspareunia in female 05/11/2020       Priority: High    Menorrhagia with regular cycle 05/11/2020       Priority: High         PAP: see above  Permanent Sterilization Completed: No      Plan:  1. TICO BLS  2. +/- Enterocele  3. Negative Wound vac placement  4. Body mass index is 36.65 kg/m².           No orders of the defined types were placed in this encounter.           Counseling: The patient was counseled on all options both medical and surgical, conservative as well as definitive. She has elected to proceed with the procedure as stated above.     The patient was counseled on the procedure. Risks and complications were reviewed in detail. The patients orders, labs, consents have been completed.  The

## 2020-07-20 NOTE — ANESTHESIA PROCEDURE NOTES
Spinal Block    Patient location during procedure: OR  Start time: 7/20/2020 10:10 AM  End time: 7/20/2020 10:14 AM  Reason for block: post-op pain management and at surgeon's request  Staffing  Anesthesiologist: Nolan Valverde MD  Resident/CRNA: ONI Kendrick CRNA  Performed: resident/CRNA   Preanesthetic Checklist  Completed: patient identified, site marked, surgical consent, pre-op evaluation, timeout performed, IV checked, risks and benefits discussed, monitors and equipment checked, anesthesia consent given, oxygen available and patient being monitored  Spinal Block  Patient position: sitting  Prep: Betadine  Patient monitoring: continuous pulse ox and frequent blood pressure checks  Approach: midline  Location: L3/L4  Procedures: paresthesia technique  Provider prep: mask  Local infiltration: lidocaine  Dose: 0.2  Agent: bupivacaine  Adjuvant: duramorph  Dose: 1  Dose: 1  Needle  Needle type: Pencan   Needle gauge: 24 G  Needle length: 3.5 in  Kit: [id number]  Lot number: [id number]  Expiration date: 9/30/2021  Assessment  Sensory level: T8  Events: cerebrospinal fluid  Swirl obtained: Yes  CSF: clear  Attempts: 1

## 2020-07-20 NOTE — OP NOTE
Gynecologic Operative Note:      NAME: Donovan Salvador   MRN: [id number]  CSN: [id number]  : 1985      PROCEDURE DATE: 2020     Pre-op Diagnosis: LARGE FIBROIDS, PELVIC PAIN, DYSMENORRHEA HEAVY IRREGULAR MENSES BMI  ORDERS IN Psychiatric     Post-op Diagnosis: Same; ENTEROCELE ACQUIRED 1-2    Procedure: Procedure(s):  TICO WITH BILATERAL SALPINGECTOMY, MYOMECTOMY, ENTEROCELE REPAIR & NEGATIVE PRESSURE WOUND VAC PLACEMENT    Surgeon: Sima Tinsley DO    Assistant:   1. Jj Hodge DO  2. A Ozzie Do  [address] [employer]    Circulator Documentation of Staff:  Surgeon(s) and Role:     * Sima Tinsley DO - Primary     * Jj Hodge DO - Assisting    OR Staff:  Scrub Person First: Favio Tadeo      Anesthesia Type: General  Anesthesia Staff: Anesthesiologist: Maggie Fay MD  CRNA: Delta Rendon, APRN - CRNA; Gavino Casas APRN - CRNA      Complications: None      Estimated Blood Loss: 100 ml  Total IV Fluids:  1200 ml  Urine Output: 125 ml clear      Specimens:   ID Type Source Tests Collected by Time Destination   1 : URINE FROM ISSA CATHETER INSERTION Urine Bladder URINE RT REFLEX TO CULTURE Buddy Paz RN 2020 1037    A : RIGHT FALLOPIAN TUBE Tissue Fallopian Tube SURGICAL PATHOLOGY Sima Alvina, DO 2020 1111    B : LEFT FALLOPIAN TUBE Tissue Fallopian Tube SURGICAL PATHOLOGY Simacaden Tinsley,  2020 1112    C : UTERUS AND CERVIX, AND UTERINE FIBROID Tissue Uterus SURGICAL PATHOLOGY Simacaden Tinsley,  2020 1133      Implants: * No implants in log *    Drains:   Urethral Catheter Non-latex (Active)         Condition: Stable    Findings: The patient's intra-abdominal cavity was explored. The liver edge was smooth without studding. Both renal beds were without masses. There was not any para-aortic or ileac lymphadenopathy. The omentum was without caking. The uterus was enlarged, 15 cm and irregular.   The ovaries were inspected and found to be without masses. There was not adhesive disease. The urine was clear in the tubing and the bag after the calderon was placed during prep and at the completion of the procedure. The ovaries were pink without any dusky appearance at the completion. The pedicles were inspected at the end of the procedure and were hemostatic. The ureters were traced at the inception of the procedure and throughout both pre and post clamp placement and were identified at the conclusion of the procedure and noted peristalsis bilaterally. All counts were correct and called for prior to closure of the peritoneum, fascial and skin layers. Description of Procedure: (Understanding of limitations from template op-reports exist)  The patient was seen in the pre-operative area. The procedure risk and complications were reviewed. The consent , labs , and H&P were reviewed. The patient had all of her questions answered. The patient was moved to the operative suite where she was placed under general anesthesia by the anesthesiologist.  The patient was then timed out. A spinal anesthetic was given prior to her general in the seated position. The patient  had a sterile prep and drape with EPC's in place, SCIP protocol antibiotics were infused, and a calderon catheter was draining clear yellow urine. A small pfannenstiel incision was made and carried down to the fascia. The fascia was taken laterally in both directions and elevated off the underlying musculature with blunt and sharp dissection. The peritoneum was identified and entered digitally. This was taken superiorly and inferiorly, care not to involve the bowel or the bladder. The upper abdomen and lower pelvis was inspected and explored. There was no paraaortic or ileac lymphadenopathy. The liver was smooth and there was no peritoneal studding. The ureters were traced bilaterally, there was no renal bed pathology as palpated.  The bowel was packed out of the surgical field with 3 lap sponges. The O'rudy retractor was utilized. All lateral blades and the attachments were palpated and there was no compression of underlying structures. Wadell Shahnaz was placed on the fundus/fibroid to bring the uterus up to the incision. Minimal stretch of the ligaments as the pt is a . Derril Kesha A ribbon was used posteriorly. The round ligaments were identified and were double clamped and cut with the bovie cautery they were tied bilaterally. The vesico-uterine refection was released with blunt and sharp dissection. The bladder was pushed out of the surgical field. The fallopian tubes were identified and grasped with an allis clamp and brought medially to the ligature backstop and will be removed with the specimen. A window was created in the broad ligament, the Uterine-Ovarian ligaments were cross clamped and tied x 2. Initially a ligature backstop was placed then a transfixation stitch was utilized after division of the tissue from between the cross clamped tissue with Metzenbaum scissors. Both sutures were \"O\"Vicryl. The posterior peritoneum was released with the Bovie cautery to drop the ureters bilaterally. This was a total of 2-3 mm. Curved Pearl's were then placed at 45 degree angles at the Corporal-Isthmic Junction, clamping off the distal end of the uterine artery and vein. They were secured with \"O\" vicryl pearl Stitches. At this point, due to limited visualization, decision was made to complete the myomectomy. The uterine serosa had an elliptical incision made over the top of the 6-8 cm myoma. Blunt dissection digitally and with a hemostat was completed with a small amount of bovie cautery to release the large 6-8 cm myoma.  With the fibroid removed and hemostasis maintained with improved visualization we continued with serial straight Zeplen clamps staying parallel to the cervix with each pedicle secured with \"O\" vicryl ties, these ties incorporated 1/3rd of the previous pedicle and stayed parallel to the cervix. The ureters were identified pre and post clamp placement. This continued to just above the distal end of the cervix. A single tooth tenaculum was placed mid-cervix and the pubocervical fascia was incised with Bovie cautrey. The vagina was entered bluntly with a Zolaggi clamp. An allis was then placed on the anterior vaginal tissue. Digna scissors were utilized to excise the remainder of the specimen. Kocher's were placed on the corners of the cuff and an allis posteriorly. The specimen was inspected and found to be intact. Utilizing \"O\" vicryl sutures, with harrison stitches and figure of \"8's\" in the corners, the cuff was closed in a running interlocking stitch from each corner to the midline. With the cuff closed, an enterocele was identified. A 2-0 vicryl stitch was used to plicate the uterosacral ligaments posteriorly and bring them back into the cuff, obliterating and repairing the enterocele. Copious irrigation and aspiration was completed. Excellent hemostasis was achieved. The cardinal ligaments were then tied into the corners recreating the cuff support with the uterosacral-Cardinal complex. Bilateral ureteral peristalsis was identified. The urine was clear in the tubing and in the bag. The Ovaries were and pink without any dusky appearance. All instruments lap sponges and retractors were removed. The bowel was returned to the normal anatomic position. Sponge needle and instrument counts were called for and found to be correct prior to closure of the peritoneum , fascia, and skin layers. GLOVES WERE THEN CHANGED. The peritoneum was closed with 2-0 vicryl suture. The fascia was closed with two \"0\" Vicryl stitches from each corner to the midline in a running NOT locking stitch. The fascia was inspected and was intact without defects. Copious irrigation and aspiration of vaibhav's space was completed.    The skin was closed with a subcuticular stitch of 4-0 vicryl with associated tinc-o-chuck and steri-strips. It was then sterilely dressed with a Negative Wound Vac. Repeat sponge needle and instrument counts were called for and found to be correct. The patient was brought out of anesthesia and moved to PACU. She will be admitted to the GYN Service. SCIP abx to continue. EPC's and Heparin for thromboembolic prophylaxis ordered. Surgical Assistant documentation:  The assistant surgeon was present to assist in the surgery and to give adequate visualization so the procedure could be completed in a safe manner with limited risks to the patient. The patients co-morbidities required the need for additional trained personnel to complete this procedure. Body mass index is appropriate for wound vac at time of H&P  Wound-Vac Applied Yes    Disposition:  The patient will return to my office in 2 weeks. We will review her pathology report and any further recommendations. She was counseled with her family that she is to report any temperature more than 100.4 F, pelvic pain, or heavy vaginal bleeding; She is to refrain from intercourse douching or tampons; No hot tubs baths lakes or pools. No driving. The patient voiced understanding of the above counseling.           Dawood Tapia DO    Electronically signed by Dawood Tapia DO on 7/20/20 at 11:54 AM EDT

## 2020-07-20 NOTE — PROGRESS NOTES
Gynecology Progress Note    Date: 2020  Time: 6:15 PM    Chela Woods 38 y.o. female , POD # 0 s/p TICO-BSO w/ enterocele repair on 2020    Patient seen and examined with senior resident. She is awake in bed and doing well. Pain is controlled with medication. Patient is tolerating oral intake. She is urinating adequately with the Tan catheter. She denies any vaginal bleeding. She has not ambulated yet but wants to try shortly. She is not passing flatus at this time. She denies Fever/Chills, Chest Pain, SOB, N/V, Calf Pain. She is experiencing some facial itching and would like to try some oral Benadryl. Vitals:  Vitals:    20 1330 20 1335 20 1348 20 1422   BP: 119/61  117/73 114/66   Pulse: 60 60 72 74   Resp: 19 17 16 16   Temp: 97.2 °F (36.2 °C)  96.1 °F (35.6 °C)    TempSrc: Infrared  Infrared    SpO2: 93% 93%     Weight:       Height:             Intake/Output:   Last Shift: I/O last 3 completed shifts: In: 1750 [I.V.:1750]  Out: 225 [Urine:125; Blood:100]  Current Shift: No intake/output data recorded.   300 mL out in last 4 hrs ~ 75 mL/hr      Physical Exam:  General:  no apparent distress, alert and cooperative  Neurologic:  alert, oriented, normal speech, no focal findings or movement disorder noted  Lungs:  No increased work of breathing, good air exchange, clear to auscultation bilaterally, no crackles or wheezing  Heart:  regular rate and rhythm and no murmur    Abdomen: soft, non-distended, appropriate tenderness, no CVA tenderness, normal bowel sounds  Incision: Prevena in place and functioning  Extremities:  no calf tenderness, non edematous    Lab:  Recent Results (from the past 12 hour(s))   Urinalysis Reflex to Culture    Collection Time: 20 10:37 AM    Specimen: Bladder; Urine   Result Value Ref Range    Color, UA YELLOW YELLOW    Turbidity UA CLEAR CLEAR    Glucose, Ur NEGATIVE NEGATIVE    Bilirubin Urine NEGATIVE NEGATIVE    Ketones, Urine

## 2020-07-20 NOTE — DISCHARGE SUMMARY
Gyn Discharge Summary  [facility]      Patient Name: Trini Savage  Patient : 1985  Primary Care Physician: Yan Shore MD  Admit Date: 2020    Principal Diagnosis: enlarged fibroid uterus, pelvic pain, dysmenorrhea, heavy irregular menses    Other Diagnosis:   Post-operative state [Z98.890]  Post-operative state [Z98.890]  S/P hysterectomy [Z90.710]  Patient Active Problem List   Diagnosis    Dysmenorrhea    Subserous leiomyoma of uterus posterior 5.1 x 5.3 x 4.5 cm    Dyspareunia in female    Menorrhagia with regular cycle    Pelvic enterocele, congenital or acquired    BMI 35.0-35.9,adult    Post-operative state    TICO-BS, Enterocele repair (2020)    S/P hysterectomy       Infection: No  Hospital Acquired: No    Surgical Operations & Procedures: Total abdominal hysterectomy, bilateral salpingo-oophorectomy, enterocele repair    Consultations: Anesthesia    Pertinent Findings & Procedures:   Trini Savage is a 38 y.o. female , admitted for surgical management of uterine fibroids with heavy irregular menses and pelvic pain; received Ancef 2g IV and spinal anesthesia pre-operatively. She underwent TICO-BSO with enterocele repair on 2020. Post-op course normal, patient received heparin for DVT prophylaxis and Ancef x24 hours. Tan was removed on POD#1 and she was discharged home on 2020. Follow up on 2020 for Post-operative appointment and Pasty Nova removal. Discharge instructions reviewed and questions answered.     Course of patient: normal    Discharge to: Home    Readmission planned: No    Recommendations on Discharge:     Medications:     Medication List      START taking these medications    docusate sodium 100 MG capsule  Commonly known as:  Colace  Take 1 capsule by mouth 2 times daily as needed for Constipation     ondansetron 4 MG disintegrating tablet  Commonly known as:  Zofran ODT  Take 1 tablet by mouth every 6 hours as needed for Nausea or Vomiting     oxyCODONE-acetaminophen 5-325 MG per tablet  Commonly known as:  Percocet  Take 1 tablet by mouth every 6 hours as needed for Pain for up to 7 days. Intended supply: 7 days. Take lowest dose possible to manage pain     simethicone 80 MG chewable tablet  Commonly known as:  MYLICON  Take 1 tablet by mouth 4 times daily as needed for Flatulence        CHANGE how you take these medications    ibuprofen 600 MG tablet  Commonly known as:  ADVIL;MOTRIN  Take 1 tablet by mouth every 6 hours as needed for Pain  What changed:    · medication strength  · how much to take  · how to take this  · when to take this  · reasons to take this        CONTINUE taking these medications    cetirizine 10 MG tablet  Commonly known as:  ZYRTEC     FLUoxetine 20 MG capsule  Commonly known as:  PROZAC     montelukast 10 MG tablet  Commonly known as:  SINGULAIR     pantoprazole 40 MG tablet  Commonly known as:  PROTONIX     pravastatin 20 MG tablet  Commonly known as:  PRAVACHOL     therapeutic multivitamin-minerals tablet     VITAMIN D-3 PO        STOP taking these medications    Junel FE 1/20 1-20 MG-MCG per tablet  Generic drug:  norethindrone-ethinyl estradiol           Where to Get Your Medications      You can get these medications from any pharmacy    Bring a paper prescription for each of these medications  · docusate sodium 100 MG capsule  · ibuprofen 600 MG tablet  · ondansetron 4 MG disintegrating tablet  · oxyCODONE-acetaminophen 5-325 MG per tablet  · simethicone 80 MG chewable tablet         Activity: pelvic rest x 6-8 weeks, no driving on narcotics, no lifting greater than 15 lbs  Diet: regular diet  Follow up: 7/27/20 for prevena dressing removal    Condition on discharge: stable   Discharge Date: 7/21/2020    Comments:  Home care, Follow-up care, restrictions reviewed.     Blanca Leggett DO  Ob/Gyn Resident  [facility]  7/21/2020, 4:14 PM

## 2020-07-20 NOTE — ANESTHESIA PRE PROCEDURE
ceFAZolin (ANCEF) 2 g in dextrose 5 % 50 mL IVPB  2 g Intravenous On Call to [address], DO        lactated ringers infusion   Intravenous Continuous Larose Fleischer,  mL/hr at 20 0935      sodium chloride flush 0.9 % injection 10 mL  10 mL Intravenous 2 times per day Larose Fleischer, DO        sodium chloride flush 0.9 % injection 10 mL  10 mL Intravenous PRN Larose Fleischer, DO           Allergies: Allergies   Allergen Reactions    Flagyl [Metronidazole] Nausea And Vomiting       Problem List:    Patient Active Problem List   Diagnosis Code    Dysmenorrhea N94.6    Subserous leiomyoma of uterus posterior 5.1 x 5.3 x 4.5 cm D25.2    Dyspareunia in female N94.10    Menorrhagia with regular cycle N92.0       Past Medical History:        Diagnosis Date    Anxiety     Clostridium difficile infection     GERD (gastroesophageal reflux disease)     Heavy menses     Hyperlipidemia     Pelvic pain     UTI (urinary tract infection)        Past Surgical History:  History reviewed. No pertinent surgical history. Social History:    Social History     Tobacco Use    Smoking status: Former Smoker     Packs/day: 1.50     Years: 9.50     Pack years: 14.25     Types: Cigarettes     Last attempt to quit: 2014     Years since quittin.0    Smokeless tobacco: Never Used   Substance Use Topics    Alcohol use:  Yes     Alcohol/week: 0.0 standard drinks     Comment: social                                Counseling given: Not Answered      Vital Signs (Current):   Vitals:    20 0919   BP: 109/75   Pulse: 67   Resp: 16   Temp: 97.7 °F (36.5 °C)   TempSrc: Infrared   SpO2: 97%   Weight: 220 lb (99.8 kg)   Height: 5' 7\" (1.702 m)                                              BP Readings from Last 3 Encounters:   20 109/75   20 126/77   20 116/86       NPO Status: Time of last liquid consumption: 2200                        Time of last solid consumption: 2200                        Date of last liquid consumption: 07/19/20                        Date of last solid food consumption: 07/19/20    BMI:   Wt Readings from Last 3 Encounters:   07/20/20 220 lb (99.8 kg)   07/13/20 220 lb (99.8 kg)   07/01/20 234 lb (106.1 kg)     Body mass index is 34.46 kg/m². CBC:   Lab Results   Component Value Date    WBC 8.5 07/13/2020    RBC 4.49 07/13/2020    HGB 13.1 07/13/2020    HCT 38.6 07/13/2020    MCV 85.9 07/13/2020    RDW 13.2 07/13/2020     07/13/2020       CMP:   Lab Results   Component Value Date     07/13/2020    K 4.5 07/13/2020     07/13/2020    CO2 23 07/13/2020    BUN 10 07/13/2020    CREATININE 0.66 07/13/2020    GFRAA >60 07/13/2020    LABGLOM >60 07/13/2020    GLUCOSE 90 07/13/2020    CALCIUM 9.3 07/13/2020       POC Tests: No results for input(s): POCGLU, POCNA, POCK, POCCL, POCBUN, POCHEMO, POCHCT in the last 72 hours.     Coags:   Lab Results   Component Value Date    PROTIME 11.5 07/13/2020    INR 0.9 07/13/2020    APTT 23.4 07/13/2020       HCG (If Applicable):   Lab Results   Component Value Date    PREGTESTUR negative 05/11/2020    HCGQUANT <1 07/13/2020        ABGs: No results found for: PHART, PO2ART, FCU8MYX, WYD8LAN, BEART, P0OTNTHH     Type & Screen (If Applicable):  No results found for: LABABO, LABRH    Drug/Infectious Status (If Applicable):  No results found for: HIV, HEPCAB    COVID-19 Screening (If Applicable):   Lab Results   Component Value Date    COVID19 Not Detected 07/16/2020         Anesthesia Evaluation  Patient summary reviewed no history of anesthetic complications:   Airway: Mallampati: II  TM distance: >3 FB   Neck ROM: full  Mouth opening: > = 3 FB Dental: normal exam         Pulmonary:Negative Pulmonary ROS and normal exam  breath sounds clear to auscultation      (-) pneumonia, COPD, asthma, shortness of breath, recent URI, sleep apnea, rhonchi, wheezes, rales, stridor and not a current smoker          Patient did not smoke on day of surgery. Cardiovascular:  Exercise tolerance: good (>4 METS),       (-) pacemaker, hypertension, valvular problems/murmurs, past MI, CAD, CABG/stent, dysrhythmias,  angina,  CHF, orthopnea, PND,  WHITE, murmur, weak pulses,  friction rub, systolic click, carotid bruit,  JVD and peripheral edema      Rhythm: regular  Rate: normal                    Neuro/Psych:   Negative Neuro/Psych ROS     (-) seizures, neuromuscular disease, TIA, CVA, headaches, psychiatric history and depression/anxiety            GI/Hepatic/Renal:   (+) GERD: no interval change,      (-) hiatal hernia, PUD, hepatitis, liver disease, no renal disease, bowel prep and no morbid obesity       Endo/Other: Negative Endo/Other ROS   (+) no malignancy/cancer. (-) diabetes mellitus, hypothyroidism, hyperthyroidism, blood dyscrasia, arthritis, no electrolyte abnormalities, no malignancy/cancer               Abdominal:           Vascular: negative vascular ROS. - PVD, DVT and PE. Anesthesia Plan      spinal and general     ASA 2       Induction: intravenous. MIPS: Postoperative opioids intended and Prophylactic antiemetics administered. Anesthetic plan and risks discussed with patient. Plan discussed with CRNA. The patient was counseled at length about the risks of lashawn Covid-19 during their perioperative period and any recovery window from their procedure. The patient was made aware that lashawn Covid-19  may worsen their prognosis for recovering from their procedure  and lend to a higher morbidity and/or mortality risk. All material risks, benefits, and reasonable alternatives including postponing the procedure were discussed. The patient DOES wish to proceed with the procedure at this time.           Nolan Valverde MD   7/20/2020

## 2020-07-21 VITALS
OXYGEN SATURATION: 93 % | BODY MASS INDEX: 34.53 KG/M2 | RESPIRATION RATE: 16 BRPM | SYSTOLIC BLOOD PRESSURE: 124 MMHG | HEART RATE: 82 BPM | HEIGHT: 67 IN | WEIGHT: 220 LBS | TEMPERATURE: 98.2 F | DIASTOLIC BLOOD PRESSURE: 72 MMHG

## 2020-07-21 PROBLEM — Z90.710 S/P HYSTERECTOMY: Status: ACTIVE | Noted: 2020-07-21

## 2020-07-21 LAB
ANION GAP SERPL CALCULATED.3IONS-SCNC: 10 MMOL/L (ref 9–17)
BUN BLDV-MCNC: 6 MG/DL (ref 6–20)
BUN/CREAT BLD: ABNORMAL (ref 9–20)
CALCIUM SERPL-MCNC: 8.4 MG/DL (ref 8.6–10.4)
CHLORIDE BLD-SCNC: 104 MMOL/L (ref 98–107)
CO2: 23 MMOL/L (ref 20–31)
CREAT SERPL-MCNC: 0.66 MG/DL (ref 0.5–0.9)
GFR AFRICAN AMERICAN: >60 ML/MIN
GFR NON-AFRICAN AMERICAN: >60 ML/MIN
GFR SERPL CREATININE-BSD FRML MDRD: ABNORMAL ML/MIN/{1.73_M2}
GFR SERPL CREATININE-BSD FRML MDRD: ABNORMAL ML/MIN/{1.73_M2}
GLUCOSE BLD-MCNC: 98 MG/DL (ref 70–99)
HCT VFR BLD CALC: 33.9 % (ref 36–46)
HEMOGLOBIN: 11.3 G/DL (ref 12–16)
POTASSIUM SERPL-SCNC: 4.1 MMOL/L (ref 3.7–5.3)
SODIUM BLD-SCNC: 137 MMOL/L (ref 135–144)

## 2020-07-21 PROCEDURE — 6370000000 HC RX 637 (ALT 250 FOR IP): Performed by: STUDENT IN AN ORGANIZED HEALTH CARE EDUCATION/TRAINING PROGRAM

## 2020-07-21 PROCEDURE — 85018 HEMOGLOBIN: CPT

## 2020-07-21 PROCEDURE — 36415 COLL VENOUS BLD VENIPUNCTURE: CPT

## 2020-07-21 PROCEDURE — 0UT20ZZ RESECTION OF BILATERAL OVARIES, OPEN APPROACH: ICD-10-PCS | Performed by: OBSTETRICS & GYNECOLOGY

## 2020-07-21 PROCEDURE — 2580000003 HC RX 258: Performed by: STUDENT IN AN ORGANIZED HEALTH CARE EDUCATION/TRAINING PROGRAM

## 2020-07-21 PROCEDURE — 0UQF0ZZ REPAIR CUL-DE-SAC, OPEN APPROACH: ICD-10-PCS | Performed by: OBSTETRICS & GYNECOLOGY

## 2020-07-21 PROCEDURE — 6360000002 HC RX W HCPCS: Performed by: STUDENT IN AN ORGANIZED HEALTH CARE EDUCATION/TRAINING PROGRAM

## 2020-07-21 PROCEDURE — 0UT70ZZ RESECTION OF BILATERAL FALLOPIAN TUBES, OPEN APPROACH: ICD-10-PCS | Performed by: OBSTETRICS & GYNECOLOGY

## 2020-07-21 PROCEDURE — 85014 HEMATOCRIT: CPT

## 2020-07-21 PROCEDURE — 0UT90ZZ RESECTION OF UTERUS, OPEN APPROACH: ICD-10-PCS | Performed by: OBSTETRICS & GYNECOLOGY

## 2020-07-21 PROCEDURE — 80048 BASIC METABOLIC PNL TOTAL CA: CPT

## 2020-07-21 RX ORDER — OXYCODONE HYDROCHLORIDE AND ACETAMINOPHEN 5; 325 MG/1; MG/1
1 TABLET ORAL EVERY 6 HOURS PRN
Qty: 28 TABLET | Refills: 0 | Status: SHIPPED | OUTPATIENT
Start: 2020-07-21 | End: 2020-07-28

## 2020-07-21 RX ORDER — SIMETHICONE 80 MG
80 TABLET,CHEWABLE ORAL 4 TIMES DAILY PRN
Qty: 180 TABLET | Refills: 0 | Status: SHIPPED | OUTPATIENT
Start: 2020-07-21

## 2020-07-21 RX ORDER — DOCUSATE SODIUM 100 MG/1
100 CAPSULE, LIQUID FILLED ORAL 2 TIMES DAILY PRN
Qty: 60 CAPSULE | Refills: 0 | Status: SHIPPED | OUTPATIENT
Start: 2020-07-21

## 2020-07-21 RX ORDER — IBUPROFEN 600 MG/1
600 TABLET ORAL EVERY 6 HOURS PRN
Qty: 120 TABLET | Refills: 0 | Status: SHIPPED | OUTPATIENT
Start: 2020-07-21 | End: 2020-08-20

## 2020-07-21 RX ORDER — ONDANSETRON 4 MG/1
4 TABLET, ORALLY DISINTEGRATING ORAL EVERY 6 HOURS PRN
Qty: 28 TABLET | Refills: 0 | Status: SHIPPED | OUTPATIENT
Start: 2020-07-21 | End: 2020-07-28

## 2020-07-21 RX ADMIN — OXYCODONE AND ACETAMINOPHEN 2 TABLET: 5; 325 TABLET ORAL at 10:10

## 2020-07-21 RX ADMIN — CEFAZOLIN 2 G: 1 INJECTION, POWDER, FOR SOLUTION INTRAMUSCULAR; INTRAVENOUS at 01:37

## 2020-07-21 RX ADMIN — FLUOXETINE HYDROCHLORIDE 20 MG: 20 CAPSULE ORAL at 09:11

## 2020-07-21 RX ADMIN — OXYCODONE AND ACETAMINOPHEN 2 TABLET: 5; 325 TABLET ORAL at 01:32

## 2020-07-21 RX ADMIN — HEPARIN SODIUM 5000 UNITS: 5000 INJECTION INTRAVENOUS; SUBCUTANEOUS at 11:04

## 2020-07-21 RX ADMIN — DOCUSATE SODIUM 100 MG: 100 CAPSULE, LIQUID FILLED ORAL at 09:10

## 2020-07-21 RX ADMIN — ONDANSETRON HYDROCHLORIDE 4 MG: 4 TABLET, FILM COATED ORAL at 10:10

## 2020-07-21 RX ADMIN — MAGNESIUM HYDROXIDE 30 ML: 400 SUSPENSION ORAL at 09:11

## 2020-07-21 RX ADMIN — OXYCODONE AND ACETAMINOPHEN 2 TABLET: 5; 325 TABLET ORAL at 05:41

## 2020-07-21 RX ADMIN — Medication 10 ML: at 09:00

## 2020-07-21 RX ADMIN — OXYCODONE AND ACETAMINOPHEN 2 TABLET: 5; 325 TABLET ORAL at 16:05

## 2020-07-21 RX ADMIN — CETIRIZINE HYDROCHLORIDE 10 MG: 10 TABLET, FILM COATED ORAL at 09:11

## 2020-07-21 RX ADMIN — IBUPROFEN 600 MG: 600 TABLET, FILM COATED ORAL at 12:45

## 2020-07-21 ASSESSMENT — PAIN SCALES - GENERAL
PAINLEVEL_OUTOF10: 8
PAINLEVEL_OUTOF10: 7
PAINLEVEL_OUTOF10: 7
PAINLEVEL_OUTOF10: 3
PAINLEVEL_OUTOF10: 7
PAINLEVEL_OUTOF10: 4

## 2020-07-21 NOTE — PROGRESS NOTES
OB/GYN Resident Result Note      AM labs reviewed. Hgb stable at 11.3. BMP unremarkable. Continue post op care. Please page with any questions/concerns. Dr. Page Simpson updated and in agreement with the plan.       Vitals:    07/20/20 1330 07/20/20 1335 07/20/20 1348 07/20/20 1422   BP: 119/61  117/73 114/66   Pulse: 60 60 72 74   Resp: 19 17 16 16   Temp: 97.2 °F (36.2 °C)  96.1 °F (35.6 °C)    TempSrc: Infrared  Infrared    SpO2: 93% 93%     Weight:       Height:             Recent Results (from the past 6 hour(s))   Hemoglobin and Hematocrit, Blood    Collection Time: 07/21/20  7:04 AM   Result Value Ref Range    Hemoglobin 11.3 (L) 12.0 - 16.0 g/dL    Hematocrit 33.9 (L) 36 - 46 %   Basic Metabolic Panel    Collection Time: 07/21/20  7:04 AM   Result Value Ref Range    Glucose 98 70 - 99 mg/dL    BUN 6 6 - 20 mg/dL    CREATININE 0.66 0.50 - 0.90 mg/dL    Bun/Cre Ratio NOT REPORTED 9 - 20    Calcium 8.4 (L) 8.6 - 10.4 mg/dL    Sodium 137 135 - 144 mmol/L    Potassium 4.1 3.7 - 5.3 mmol/L    Chloride 104 98 - 107 mmol/L    CO2 23 20 - 31 mmol/L    Anion Gap 10 9 - 17 mmol/L    GFR Non-African American >60 >60 mL/min    GFR African American >60 >60 mL/min    GFR Comment          GFR Staging NOT REPORTED          Thom Jauregui DO  OB/GYN Resident  PGY3  [facility] [address]  7/21/2020, 8:15 AM

## 2020-07-21 NOTE — PROGRESS NOTES
OB/GYN Resident Interval Note    Spoke with RN regarding update on patient. Patient has had adequate urine output, tolerating PO, has pain controlled, ambulating without difficulty, and is passing gas. Patient is wearing her street clothes and sitting up in the chair. She reports that she has good support at home. Patient is requesting discharge at this time. All discharge instructions and return precautions were reviewed with the patient. Prevena dressing to be removed at follow up appointment with Dr. Ulysses Paul or Dr. Roderick Carroll on 7/27/2020. Plan discussed with Dr. Roderick Carroll and she is in agreement with discharge after 1700 today (7/21/2020).          Constanza Flowers DO  OB/GYN Resident  PGY3  [facility], [place]  7/21/2020, 4:07 PM

## 2020-07-22 LAB — SURGICAL PATHOLOGY REPORT: NORMAL

## 2020-07-27 ENCOUNTER — OFFICE VISIT (OUTPATIENT)
Dept: OBGYN CLINIC | Age: 35
End: 2020-07-27

## 2020-07-27 ENCOUNTER — HOSPITAL ENCOUNTER (OUTPATIENT)
Age: 35
Setting detail: SPECIMEN
Discharge: HOME | End: 2020-07-27
Payer: COMMERCIAL

## 2020-07-27 VITALS
DIASTOLIC BLOOD PRESSURE: 60 MMHG | HEART RATE: 80 BPM | BODY MASS INDEX: 36.88 KG/M2 | SYSTOLIC BLOOD PRESSURE: 110 MMHG | WEIGHT: 235 LBS | TEMPERATURE: 97.5 F | HEIGHT: 67 IN

## 2020-07-27 LAB
-: ABNORMAL
AMORPHOUS: ABNORMAL
BACTERIA: ABNORMAL
BILIRUBIN URINE: NEGATIVE
CASTS UA: ABNORMAL /LPF
COLOR: YELLOW
COMMENT UA: ABNORMAL
CRYSTALS, UA: ABNORMAL /HPF
EPITHELIAL CELLS UA: ABNORMAL /HPF
GLUCOSE URINE: NEGATIVE
KETONES, URINE: NEGATIVE
LEUKOCYTE ESTERASE, URINE: ABNORMAL
MUCUS: ABNORMAL
NITRITE, URINE: NEGATIVE
OTHER OBSERVATIONS UA: ABNORMAL
PH UA: 6 (ref 5–8)
PROTEIN UA: NEGATIVE
RBC UA: ABNORMAL /HPF
RENAL EPITHELIAL, UA: ABNORMAL /HPF
SPECIFIC GRAVITY UA: 1.02 (ref 1–1.03)
TRICHOMONAS: ABNORMAL
TURBIDITY: ABNORMAL
URINE HGB: ABNORMAL
UROBILINOGEN, URINE: NORMAL
WBC UA: ABNORMAL /HPF
YEAST: ABNORMAL

## 2020-07-27 PROCEDURE — 99024 POSTOP FOLLOW-UP VISIT: CPT | Performed by: NURSE PRACTITIONER

## 2020-07-27 PROCEDURE — 81001 URINALYSIS AUTO W/SCOPE: CPT

## 2020-07-27 NOTE — PROGRESS NOTES
Patient presents for LEONIDES drain removal following surgery on 7/20/20 with Dr. Maria Dolores Perez. Old sanginous drainage noted to dressing pad upon removal. Incision site appeared well-approximated with no redness to site. No evidence of new or current drainage. Area cleansed with hydrogen peroxide and left open to air. Signs and symptoms of infection reviewed with patient. Patient agreeble to call office for advice with any signs of positive infection. Patient reports dysuria with urination. Patient states she did have a calderon catheter. Clean catch urine sample collected for urinalysis reflex to culture to rule out UTI.

## 2020-07-29 ENCOUNTER — OFFICE VISIT (OUTPATIENT)
Dept: OBGYN CLINIC | Age: 35
End: 2020-07-29

## 2020-07-29 VITALS
HEART RATE: 103 BPM | HEIGHT: 67 IN | SYSTOLIC BLOOD PRESSURE: 124 MMHG | TEMPERATURE: 97.2 F | WEIGHT: 232 LBS | DIASTOLIC BLOOD PRESSURE: 82 MMHG | BODY MASS INDEX: 36.41 KG/M2 | RESPIRATION RATE: 18 BRPM

## 2020-07-29 PROCEDURE — 99024 POSTOP FOLLOW-UP VISIT: CPT | Performed by: OBSTETRICS & GYNECOLOGY

## 2020-07-29 RX ORDER — OXYCODONE HYDROCHLORIDE AND ACETAMINOPHEN 5; 325 MG/1; MG/1
1 TABLET ORAL EVERY 4 HOURS PRN
COMMUNITY

## 2020-07-29 RX ORDER — CIPROFLOXACIN 500 MG/1
500 TABLET, FILM COATED ORAL 2 TIMES DAILY
Qty: 14 TABLET | Refills: 0 | Status: SHIPPED | OUTPATIENT
Start: 2020-07-29 | End: 2020-08-05

## 2020-07-29 NOTE — PROGRESS NOTES
Marcelino Durán  2020  11:12 AM      Beth Juares  Procedure:   PROCEDURE DATE: 2020      Pre-op Diagnosis: LARGE FIBROIDS, PELVIC PAIN, DYSMENORRHEA HEAVY IRREGULAR MENSES BMI  ORDERS IN [facility]     Post-op Diagnosis: Same; ENTEROCELE ACQUIRED 1-2     Procedure: Procedure(s):  TICO WITH BILATERAL SALPINGECTOMY, MYOMECTOMY, ENTEROCELE REPAIR & NEGATIVE PRESSURE WOUND VAC PLACEMENT      Marcelino Durán is a 38 y.o. female       The patient was seen, she denies any complaints. She denied any shortness of breath, chest pain or dizziness. She denied any nausea, vomiting, or diarrhea. There is no fever, chills, or rigors. The patient denies any vaginal bleeding, discharge or odor. All of her pre-operative complaints are now resolved. Blood pressure 124/82, pulse 103, temperature 97.2 °F (36.2 °C), resp. rate 18, height 5' 7\" (1.702 m), weight 232 lb (105.2 kg), last menstrual period 2020, not currently breastfeeding. Abdominal Exam: soft non-tender. Good bowel sounds. No guarding, rebound or rigidity. No costal vertebral angle tenderness bilateral. No hernias    Incision: healing well, no drainage, no erythema, no hernia, no seroma, no swelling, well approximated. No hernias    Extremities: No edema or calf pain noted bilaterally.     Pelvic Exam: Declined      Results for orders placed or performed during the hospital encounter of 20   Urinalysis Reflex to Culture    Specimen: Urine, clean catch   Result Value Ref Range    Color, UA YELLOW YELLOW    Turbidity UA CLOUDY (A) CLEAR    Glucose, Ur NEGATIVE NEGATIVE    Bilirubin Urine NEGATIVE NEGATIVE    Ketones, Urine NEGATIVE NEGATIVE    Specific Gravity, UA 1.018 1.000 - 1.030    Urine Hgb TRACE (A) NEGATIVE    pH, UA 6.0 5.0 - 8.0    Protein, UA NEGATIVE NEGATIVE    Urobilinogen, Urine Normal Normal    Nitrite, Urine NEGATIVE NEGATIVE    Leukocyte Esterase, Urine SMALL (A) NEGATIVE    Urinalysis Comments NOT REPORTED Microscopic Urinalysis   Result Value Ref Range    -          WBC, UA 2 TO 5 /HPF    RBC, UA 0 TO 2 /HPF    Casts UA NOT REPORTED /LPF    Crystals, UA NOT REPORTED None /HPF    Epithelial Cells UA 5 TO 10 /HPF    Renal Epithelial, UA NOT REPORTED 0 /HPF    Bacteria, UA FEW (A) None    Mucus, UA NOT REPORTED None    Trichomonas, UA NOT REPORTED None    Amorphous, UA NOT REPORTED None    Other Observations UA NOT REPORTED NOT REQ. Yeast, UA NOT REPORTED None       7/22/2020  3:16 PM - Cesar Lawrence Incoming Lab Results From [facility]     Component  Collected  Lab    Surgical Pathology Report  07/20/2020 11:11 AM  [facility]    [address]   [address]   [phone]   Fax: [phone]   SURGICAL PATHOLOGY REPORT     Patient Name: Octavio French   MR#: [id number]   Specimen #[id number]         Final Diagnosis   SPECIMEN \"A\":  RIGHT FALLOPIAN TUBE:          NO PATHOLOGIC DIAGNOSIS     SPECIMEN \"B\":  LEFT FALLOPIAN TUBE:          NO PATHOLOGIC DIAGNOSIS                BENIGN PARATUBAL CYST     SPECIMEN \"C\":  UTERUS:          UNREMARKABLE UTERINE CERVIX     BENIGN ENDOMETRIAL MUCOSA WITH FEATURES SUGGESTIVE OF EXOGENOUS   PROGESTERONE ADMINISTRATION     LEIOMYOMA       Aquilla Babinski.  Marie Kulkarni D.O.   **Electronically Signed Out**         js/7/22/2020     Clinical Information   Large fibroids, pelvic pain, dysmenorrhea; TICO with bilateral   salpingectomy, myomectomy, enterocele repair & negative pressure   sound vac placement, COVID 7/16; formalin time: A-B) 11:12, C) 11:36     Source:   A: Right fallopian tube   B: Left fallopian tube   C: Uterus and cervix and uterine fibroid     Gross Description   Specimen \"A\":  Received in formalin labeled \"right fallopian tube\" is   a resected fallopian tube measuring 7.0 in length with a cross   sectional diameter up to 1.0 cm.  The fimbriated end demonstrates the   usual villous appearance.  The specimen is sectioned and the entire   distal end is submitted in two cassettes.  [facility]     Specimen \"B\":  Received in formalin labeled \"left fallopian tube\" is a   resected fallopian tube measuring 4.0 cm in length with a cross   sectional diameter of 0.8 cm.  A paratubal cyst measuring   approximately 0.6 cm in greatest dimension is present.  The specimen   is sectioned and the entire distal end is submitted for microscopic   examination in two cassettes.  [facility]     Specimen \"B\":  Received in formalin is a uterus with attached cervix   and separate uterine fibroid measuring 208 gm.  The uterus measures 10   cm in length, 6 cm from cornu to cornu and 4.5 cm from the   anteroposterior fundal dimension.  The exocervix measures 2.8 x 2.5   cm.  The external cervical os measures 0.5 cm.  The serosal surfaces   were intact, pink and glistening.  Upon opening the uterus, the   endometrial cavity is triangular in shape and measures 2.0 x 3.0 cm. The myometrium measures 1.5 cm in thickness.  The endocervical canal   is pink and glistening with the usual longitudinal folds.  A section   from the anterior cervix is submitted in cassette #1; posterior cervix   in cassette #2.  The endometrium is triangular in shape and measures   2.0 x 3.0 cm.  The endometrium is pinkish tan and measures 0.2 cm in   thickness.  Sections of the endomyometrium are submitted in cassettes   #3 through #6.  The separate fibroid measures 7.0 x 6.0 x 4.5 cm. Sectioning through this fibroid, the cut surface is gray-white and   rubbery.  No zones of hemorrhage or necrosis are identified.  Sections   from this fibroid are submitted in cassette #7 through #10.   [facility]               Microscopic Description   Specimen \"A\":  Microscopic examination of two H&E slides confirms   the diagnosis.         Specimen \"B\":  Microscopic examination of two H&E slides confirms   the diagnosis.          Specimen \"C\":  Microscopic examination of two H&E slides confirms   the diagnosis.            Assessment:      Diagnosis Orders   1. Postop check     2. TICO-BS, Enterocele repair (7/20/2020)     3. Subserous leiomyoma of uterus posterior 5.1 x 5.3 x 4.5 cm     4. Urgency of micturition  ciprofloxacin (CIPRO) 500 MG tablet        Patient Active Problem List    Diagnosis Date Noted    Dysmenorrhea 05/11/2020     Priority: High    Subserous leiomyoma of uterus posterior 5.1 x 5.3 x 4.5 cm 05/11/2020     Priority: High    Dyspareunia in female 05/11/2020     Priority: High    Menorrhagia with regular cycle 05/11/2020     Priority: High    S/P hysterectomy 07/21/2020    Post-operative state 07/20/2020    TICO-BS, Enterocele repair (7/20/2020) 07/20/2020    Pelvic enterocele, congenital or acquired     BMI 35.0-35.9,adult           POD# 9   Procedure:   PROCEDURE DATE: 7/20/2020   Procedure: Procedure(s):  TICO WITH BILATERAL SALPINGECTOMY, MYOMECTOMY, ENTEROCELE REPAIR & NEGATIVE PRESSURE WOUND VAC PLACEMENT   Stable   Pathology reviewed and found to be benign. Yes    Plan:   Return in about 5 weeks (around 9/2/2020) for Post Operative Evaluation with spec. Continue with restrictions. Pelvic rest. No lifting or intercourse. No baths or pools. No douching or tampons.    Return to office 5 weeks   Ice to sites of knots for comfort care use ziplock  Any worsening symptoms to ED

## 2020-08-19 PROBLEM — Z98.890 POST-OPERATIVE STATE: Status: RESOLVED | Noted: 2020-07-20 | Resolved: 2020-08-19

## 2020-09-01 ENCOUNTER — OFFICE VISIT (OUTPATIENT)
Dept: OBGYN CLINIC | Age: 35
End: 2020-09-01

## 2020-09-01 VITALS
TEMPERATURE: 97.4 F | BODY MASS INDEX: 37.04 KG/M2 | SYSTOLIC BLOOD PRESSURE: 122 MMHG | WEIGHT: 236 LBS | HEIGHT: 67 IN | HEART RATE: 86 BPM | DIASTOLIC BLOOD PRESSURE: 80 MMHG

## 2020-09-01 PROCEDURE — 99024 POSTOP FOLLOW-UP VISIT: CPT | Performed by: OBSTETRICS & GYNECOLOGY

## 2020-09-01 RX ORDER — FLUOXETINE HYDROCHLORIDE 40 MG/1
CAPSULE ORAL
COMMUNITY
Start: 2020-07-29

## 2020-09-01 NOTE — PROGRESS NOTES
Beth Burnhamncer  9/1/2020  9:44 AM      Beth Burnhamncer  Procedure:   PROCEDURE DATE: 7/20/2020      Pre-op Diagnosis: LARGE FIBROIDS, PELVIC PAIN, DYSMENORRHEA HEAVY IRREGULAR MENSES BMI  ORDERS IN [facility]     Post-op Diagnosis: Same; ENTEROCELE ACQUIRED 1-2     Procedure: Procedure(s):  TICO WITH BILATERAL SALPINGECTOMY, MYOMECTOMY, ENTEROCELE REPAIR & NEGATIVE PRESSURE WOUND VAC PLACEMENT      Dell Singh is a 38 y.o. female [place]      The patient was seen, she denies any complaints. She denied any shortness of breath, chest pain or dizziness. She denied any nausea, vomiting, or diarrhea. There is no fever, chills, or rigors. The patient denies any vaginal bleeding, discharge or odor. All of her pre-operative complaints are now resolved. Blood pressure 122/80, pulse 86, temperature 97.4 °F (36.3 °C), height 5' 7\" (1.702 m), weight 236 lb (107 kg), last menstrual period 06/20/2020, not currently breastfeeding. Abdominal Exam: soft non-tender. Good bowel sounds. No guarding, rebound or rigidity. No costal vertebral angle tenderness bilateral. No hernias    Incision: healing well, no drainage, no erythema, no hernia, no seroma, no swelling, well approximated    Extremities: No edema or calf pain noted bilaterally. Pelvic Exam: Spec only. Cuff healed no discharge. No s/s infection.  Good length and support no defects      Results for orders placed or performed during the hospital encounter of 07/27/20   Urinalysis Reflex to Culture    Specimen: Urine, clean catch   Result Value Ref Range    Color, UA YELLOW YELLOW    Turbidity UA CLOUDY (A) CLEAR    Glucose, Ur NEGATIVE NEGATIVE    Bilirubin Urine NEGATIVE NEGATIVE    Ketones, Urine NEGATIVE NEGATIVE    Specific Gravity, UA 1.018 1.000 - 1.030    Urine Hgb TRACE (A) NEGATIVE    pH, UA 6.0 5.0 - 8.0    Protein, UA NEGATIVE NEGATIVE    Urobilinogen, Urine Normal Normal    Nitrite, Urine NEGATIVE NEGATIVE    Leukocyte Esterase, Urine SMALL (A) NEGATIVE    Urinalysis Comments NOT REPORTED    Microscopic Urinalysis   Result Value Ref Range    -          WBC, UA 2 TO 5 /HPF    RBC, UA 0 TO 2 /HPF    Casts UA NOT REPORTED /LPF    Crystals, UA NOT REPORTED None /HPF    Epithelial Cells UA 5 TO 10 /HPF    Renal Epithelial, UA NOT REPORTED 0 /HPF    Bacteria, UA FEW (A) None    Mucus, UA NOT REPORTED None    Trichomonas, UA NOT REPORTED None    Amorphous, UA NOT REPORTED None    Other Observations UA NOT REPORTED NOT REQ. Yeast, UA NOT REPORTED None           Assessment:      Diagnosis Orders   1. Postop check     2.  TICO-BS, Enterocele repair (7/20/2020)          Patient Active Problem List    Diagnosis Date Noted    Dysmenorrhea 05/11/2020     Priority: High    Subserous leiomyoma of uterus posterior 5.1 x 5.3 x 4.5 cm 05/11/2020     Priority: High    Dyspareunia in female 05/11/2020     Priority: High    Menorrhagia with regular cycle 05/11/2020     Priority: High    S/P hysterectomy 07/21/2020    TICO-BS, Enterocele repair (7/20/2020) 07/20/2020    Pelvic enterocele, congenital or acquired     BMI 35.0-35.9,adult           POD# 6 weeks   Procedure: TICO WITH BILATERAL SALPINGECTOMY, MYOMECTOMY, ENTEROCELE REPAIR & NEGATIVE PRESSURE WOUND VAC PLACEMENT  Stable    Plan:   Return in 1 year (on 8/15/2021) for Annual.

## 2020-11-09 ENCOUNTER — OFFICE VISIT (OUTPATIENT)
Dept: OBGYN CLINIC | Age: 35
End: 2020-11-09
Payer: COMMERCIAL

## 2020-11-09 ENCOUNTER — HOSPITAL ENCOUNTER (OUTPATIENT)
Age: 35
Setting detail: SPECIMEN
Discharge: HOME | End: 2020-11-09
Payer: COMMERCIAL

## 2020-11-09 VITALS
DIASTOLIC BLOOD PRESSURE: 72 MMHG | TEMPERATURE: 97.5 F | HEIGHT: 67 IN | WEIGHT: 243 LBS | BODY MASS INDEX: 38.14 KG/M2 | SYSTOLIC BLOOD PRESSURE: 122 MMHG

## 2020-11-09 LAB
BILIRUBIN URINE: NEGATIVE
COLOR: YELLOW
COMMENT UA: NORMAL
DIRECT EXAM: NORMAL
GLUCOSE URINE: NEGATIVE
KETONES, URINE: NEGATIVE
LEUKOCYTE ESTERASE, URINE: NEGATIVE
Lab: NORMAL
NITRITE, URINE: NEGATIVE
PH UA: 6 (ref 5–8)
PROTEIN UA: NEGATIVE
SPECIFIC GRAVITY UA: 1.01 (ref 1–1.03)
SPECIMEN DESCRIPTION: NORMAL
TURBIDITY: CLEAR
URINE HGB: NEGATIVE
UROBILINOGEN, URINE: NORMAL

## 2020-11-09 PROCEDURE — 87480 CANDIDA DNA DIR PROBE: CPT

## 2020-11-09 PROCEDURE — 87491 CHLMYD TRACH DNA AMP PROBE: CPT

## 2020-11-09 PROCEDURE — 87591 N.GONORRHOEAE DNA AMP PROB: CPT

## 2020-11-09 PROCEDURE — G8484 FLU IMMUNIZE NO ADMIN: HCPCS | Performed by: NURSE PRACTITIONER

## 2020-11-09 PROCEDURE — G8427 DOCREV CUR MEDS BY ELIG CLIN: HCPCS | Performed by: NURSE PRACTITIONER

## 2020-11-09 PROCEDURE — G8417 CALC BMI ABV UP PARAM F/U: HCPCS | Performed by: NURSE PRACTITIONER

## 2020-11-09 PROCEDURE — 87510 GARDNER VAG DNA DIR PROBE: CPT

## 2020-11-09 PROCEDURE — 81003 URINALYSIS AUTO W/O SCOPE: CPT

## 2020-11-09 PROCEDURE — 99213 OFFICE O/P EST LOW 20 MIN: CPT | Performed by: NURSE PRACTITIONER

## 2020-11-09 PROCEDURE — 87660 TRICHOMONAS VAGIN DIR PROBE: CPT

## 2020-11-09 PROCEDURE — 1036F TOBACCO NON-USER: CPT | Performed by: NURSE PRACTITIONER

## 2020-11-09 RX ORDER — FLUCONAZOLE 150 MG/1
150 TABLET ORAL ONCE
Qty: 2 TABLET | Refills: 2 | Status: SHIPPED | OUTPATIENT
Start: 2020-11-09 | End: 2020-11-09

## 2020-11-09 RX ORDER — CLOTRIMAZOLE AND BETAMETHASONE DIPROPIONATE 10; .64 MG/G; MG/G
CREAM TOPICAL
Qty: 1 TUBE | Refills: 1 | Status: SHIPPED | OUTPATIENT
Start: 2020-11-09

## 2020-11-09 RX ORDER — TOLTERODINE 4 MG/1
CAPSULE, EXTENDED RELEASE ORAL
COMMUNITY
Start: 2020-11-03

## 2020-11-09 NOTE — PROGRESS NOTES
Per Garay  2020    YOB: 1985          The patient was seen today. She is here regarding cramping to lower vagina. Patient feels like this when she has a UTI. Given an antibiotic for UTI by her PCP and finished less than a week ago. Can't remember the name. Completed UA C&S while she was on antiobiotic 1 week ago and was told it was normal.  Results unavailable. Hx of frequent UTIs. Has normal clear vaginal discharge. Has not been sexually active in 1 month. Hx of hysterectomy in 2020 with DR Jayleen Salinas. Patient was prescribed detrol LA for overactive bladder 1 week ago but did not start it yet. Her bowels are regular and she is voiding without difficulty.      HPI:  Per Garay is a 38 y.o. female      Vaginal cramping  Increase in urine frequency      OB History    Para Term  AB Living   0 0 0 0 0 0   SAB TAB Ectopic Molar Multiple Live Births   0 0 0 0 0 0       Past Medical History:   Diagnosis Date    Anxiety     Clostridium difficile infection     GERD (gastroesophageal reflux disease)     Heavy menses     Hyperlipidemia     Pelvic pain     UTI (urinary tract infection)        Past Surgical History:   Procedure Laterality Date    HYSTERECTOMY, TOTAL ABDOMINAL N/A 2020    TICO WITH BILATERAL SALPINGECTOMY, MYOMECTOMY, ENTEROCELE REPAIR & NEGATIVE PRESSURE WOUND VAC PLACEMENT performed by Suni Bryan DO at [facility] AND [facility] OR       Family History   Problem Relation Age of Onset    Breast Cancer Paternal Grandmother     Breast Cancer Maternal Grandmother     Lung Cancer Mother        Social History     Socioeconomic History    Marital status: Single     Spouse name: Not on file    Number of children: Not on file    Years of education: Not on file    Highest education level: Not on file   Occupational History    Not on file   Social Needs    Financial resource strain: Not on file    Food insecurity     Worry: Not on file Inability: Not on file    Transportation needs     Medical: Not on file     Non-medical: Not on file   Tobacco Use    Smoking status: Former Smoker     Packs/day: 1.50     Years: 9.50     Pack years: 14.25     Types: Cigarettes     Last attempt to quit: 2014     Years since quittin.3    Smokeless tobacco: Never Used   Substance and Sexual Activity    Alcohol use: Yes     Alcohol/week: 0.0 standard drinks     Comment: social    Drug use: No    Sexual activity: Yes     Partners: Male     Birth control/protection: Pill   Lifestyle    Physical activity     Days per week: Not on file     Minutes per session: Not on file    Stress: Not on file   Relationships    Social connections     Talks on phone: Not on file     Gets together: Not on file     Attends Spiritism service: Not on file     Active member of club or organization: Not on file     Attends meetings of clubs or organizations: Not on file     Relationship status: Not on file    Intimate partner violence     Fear of current or ex partner: Not on file     Emotionally abused: Not on file     Physically abused: Not on file     Forced sexual activity: Not on file   Other Topics Concern    Not on file   Social History Narrative    Not on file         MEDICATIONS:  Current Outpatient Medications   Medication Sig Dispense Refill    fluconazole (DIFLUCAN) 150 MG tablet Take 1 tablet by mouth once for 1 dose Repeat in 7 days. 2 tablet 2    clotrimazole-betamethasone (LOTRISONE) 1-0.05 % cream Apply to affected area bid externally x 4 days then daily for 3 days 1 Tube 1    tolterodine (DETROL LA) 4 MG extended release capsule       FLUoxetine (PROZAC) 40 MG capsule TK 1 C PO QD      oxyCODONE-acetaminophen (PERCOCET) 5-325 MG per tablet Take 1 tablet by mouth every 4 hours as needed for Pain.       ibuprofen (ADVIL;MOTRIN) 600 MG tablet Take 1 tablet by mouth every 6 hours as needed for Pain 120 tablet 0    simethicone (MYLICON) 80 MG chewable tablet Take 1 tablet by mouth 4 times daily as needed for Flatulence 180 tablet 0    docusate sodium (COLACE) 100 MG capsule Take 1 capsule by mouth 2 times daily as needed for Constipation 60 capsule 0    Multiple Vitamins-Minerals (THERAPEUTIC MULTIVITAMIN-MINERALS) tablet Take 1 tablet by mouth daily      Cholecalciferol (VITAMIN D-3 PO) Take by mouth daily      cetirizine (ZYRTEC) 10 MG tablet Take 10 mg by mouth daily      FLUoxetine (PROZAC) 20 MG capsule Take 20 mg by mouth daily      montelukast (SINGULAIR) 10 MG tablet Take 10 mg by mouth nightly      pantoprazole (PROTONIX) 40 MG tablet daily   0    pravastatin (PRAVACHOL) 20 MG tablet daily   0     No current facility-administered medications for this visit. ALLERGIES:  Allergies as of 11/09/2020    (No Known Allergies)         REVIEW OF SYSTEMS:    yes   A minimum of an eleven point review of systems was completed. Review Of Systems (11 point):  Constitutional: No fever, chills or malaise; No weight change or fatigue  Head and Eyes: No vision, Headache, Dizziness or trauma in last 12 months  ENT ROS: No hearing, Tinnitis, sinus or taste problems  Hematological and Lymphatic ROS:No Lymphoma, Von Willebrand's, Hemophillia or Bleeding History  Psych ROS: No Depression, Homicidal thoughts,suicidal thoughts, or anxiety  Breast ROS: No prior breast abnormalities or lumps  Respiratory ROS: No SOB, Pneumoniae,Cough, or Pulmonary Embolism History  Cardiovascular ROS: No Chest Pain with Exertion, Palpitations, Syncope, Edema, Arrhythmia  Gastrointestinal ROS: No Indigestion, Heartburn, Nausea, vomiting, Diarrhea, Constipation,or Bowel Changes; No Bloody Stools or melena  Genito-Urinary ROS: No Dysuria, Hematuria or Nocturia. No Urinary Incontinence or Vaginal Discharge.  + urinary frequency, vaginal cramping  Musculoskeletal ROS: No Arthralgia, Arthritis,Gout,Osteoporosis or Rheumatism  Neurological ROS: No CVA, Migraines, Epilepsy, Seizure Hx, or Limb Weakness  Dermatological ROS: No Rash, Itching, Hives, Mole Changes or Cancer          Blood pressure 122/72, temperature 97.5 °F (36.4 °C), height 5' 7\" (1.702 m), weight 243 lb (110.2 kg), last menstrual period 06/20/2020, not currently breastfeeding. Chaperone for Intimate Exam   Chaperone was offered and accepted as part of the rooming process.  Chaperone: Bernardo Soto         Abdomen: Soft non-tender; good bowel sounds. No guarding, rebound or rigidity. No CVA tenderness bilaterally. Extremities: No calf tenderness, DTR 2/4, and No edema bilaterally    Pelvic: Vulva and vagina appear normal. Bimanual exam reveals normal cuff intact. Moderate amount of thick white discharge noted. Redness noted to opening of vagina. Diagnostics:  No results found. Lab Results:  Results for orders placed or performed during the hospital encounter of 07/27/20   Urinalysis Reflex to Culture    Specimen: Urine, clean catch   Result Value Ref Range    Color, UA YELLOW YELLOW    Turbidity UA CLOUDY (A) CLEAR    Glucose, Ur NEGATIVE NEGATIVE    Bilirubin Urine NEGATIVE NEGATIVE    Ketones, Urine NEGATIVE NEGATIVE    Specific Gravity, UA 1.018 1.000 - 1.030    Urine Hgb TRACE (A) NEGATIVE    pH, UA 6.0 5.0 - 8.0    Protein, UA NEGATIVE NEGATIVE    Urobilinogen, Urine Normal Normal    Nitrite, Urine NEGATIVE NEGATIVE    Leukocyte Esterase, Urine SMALL (A) NEGATIVE    Urinalysis Comments NOT REPORTED    Microscopic Urinalysis   Result Value Ref Range    -          WBC, UA 2 TO 5 /HPF    RBC, UA 0 TO 2 /HPF    Casts UA NOT REPORTED /LPF    Crystals, UA NOT REPORTED None /HPF    Epithelial Cells UA 5 TO 10 /HPF    Renal Epithelial, UA NOT REPORTED 0 /HPF    Bacteria, UA FEW (A) None    Mucus, UA NOT REPORTED None    Trichomonas, UA NOT REPORTED None    Amorphous, UA NOT REPORTED None    Other Observations UA NOT REPORTED NOT REQ. Yeast, UA NOT REPORTED None         Assessment:   Diagnosis Orders   1.  Vaginal cramping  C.trachomatis N.gonorrhoeae DNA    Urinalysis Reflex to Culture    VAGINITIS DNA PROBE   2. Urinary frequency  Urinalysis Reflex to Culture   3. Vaginal irritation  fluconazole (DIFLUCAN) 150 MG tablet     Patient Active Problem List    Diagnosis Date Noted    S/P hysterectomy 07/21/2020    TICO-BS, Enterocele repair (7/20/2020) 07/20/2020    Pelvic enterocele, congenital or acquired     BMI 35.0-35.9,adult     Dysmenorrhea 05/11/2020    Subserous leiomyoma of uterus posterior 5.1 x 5.3 x 4.5 cm 05/11/2020    Dyspareunia in female 05/11/2020    Menorrhagia with regular cycle 05/11/2020           PLAN:  Return if symptoms worsen or fail to improve. Vaginal cultures and UA C&S obtained. Prescription for diflucan and lotrisone cream sent to pharmacy pending results. Call office if no relief. Repeat Annual every 1 year  Cervical Cytology Evaluation begins at 24years old. If Negative Cytology, Follow-up screening per current guidelines. Return to the office in PRN weeks. Counseled on preventative health maintenance follow-up. Orders Placed This Encounter   Procedures    C.trachomatis N.gonorrhoeae DNA     Standing Status:   Future     Standing Expiration Date:   11/9/2021    VAGINITIS DNA PROBE     Standing Status:   Future     Standing Expiration Date:   11/9/2021    Urinalysis Reflex to Culture     Standing Status:   Future     Standing Expiration Date:   11/9/2021     Order Specific Question:   SPECIFY(EX-CATH,MIDSTREAM,CYSTO,ETC)? Answer:   clean catch       Patient was seen with total face to face time of 15 minutes. More than 50% of this visit was counseling and education regarding The primary encounter diagnosis was Vaginal cramping. Diagnoses of Urinary frequency and Vaginal irritation were also pertinent to this visit. and Vaginitis   as well as  counseling on preventative health maintenance follow-up.

## 2023-03-26 ENCOUNTER — APPOINTMENT (OUTPATIENT)
Dept: GENERAL RADIOLOGY | Age: 38
End: 2023-03-26
Payer: COMMERCIAL

## 2023-03-26 ENCOUNTER — HOSPITAL ENCOUNTER (EMERGENCY)
Age: 38
Discharge: HOME | End: 2023-03-26
Attending: EMERGENCY MEDICINE
Payer: COMMERCIAL

## 2023-03-26 VITALS
DIASTOLIC BLOOD PRESSURE: 84 MMHG | SYSTOLIC BLOOD PRESSURE: 129 MMHG | HEART RATE: 75 BPM | TEMPERATURE: 98.4 F | RESPIRATION RATE: 14 BRPM | OXYGEN SATURATION: 98 %

## 2023-03-26 DIAGNOSIS — S62.639B OPEN DISPLACED FRACTURE OF DISTAL PHALANX OF FINGER OF LEFT HAND: Primary | ICD-10-CM

## 2023-03-26 PROCEDURE — 99283 EMERGENCY DEPT VISIT LOW MDM: CPT

## 2023-03-26 PROCEDURE — 73130 X-RAY EXAM OF HAND: CPT

## 2023-03-26 PROCEDURE — 6370000000 HC RX 637 (ALT 250 FOR IP): Performed by: STUDENT IN AN ORGANIZED HEALTH CARE EDUCATION/TRAINING PROGRAM

## 2023-03-26 PROCEDURE — 29131 APPL FINGER SPLINT DYNAMIC: CPT

## 2023-03-26 RX ORDER — CEPHALEXIN 500 MG/1
500 CAPSULE ORAL ONCE
Status: COMPLETED | OUTPATIENT
Start: 2023-03-26 | End: 2023-03-26

## 2023-03-26 RX ORDER — CEPHALEXIN 500 MG/1
500 CAPSULE ORAL 4 TIMES DAILY
Qty: 28 CAPSULE | Refills: 0 | Status: SHIPPED | OUTPATIENT
Start: 2023-03-26 | End: 2023-04-02

## 2023-03-26 RX ORDER — ACETAMINOPHEN 325 MG/1
650 TABLET ORAL ONCE
Status: COMPLETED | OUTPATIENT
Start: 2023-03-26 | End: 2023-03-26

## 2023-03-26 RX ORDER — IBUPROFEN 400 MG/1
400 TABLET ORAL ONCE
Status: COMPLETED | OUTPATIENT
Start: 2023-03-26 | End: 2023-03-26

## 2023-03-26 RX ADMIN — IBUPROFEN 400 MG: 400 TABLET, FILM COATED ORAL at 11:23

## 2023-03-26 RX ADMIN — CEPHALEXIN 500 MG: 500 CAPSULE ORAL at 12:40

## 2023-03-26 RX ADMIN — ACETAMINOPHEN 650 MG: 325 TABLET ORAL at 11:23

## 2023-03-26 NOTE — ED PROVIDER NOTES
[facility]  Emergency Department  Faculty Attestation     I performed a history and physical examination of the patient and discussed management with the resident. I reviewed the residents note and agree with the documented findings and plan of care. Any areas of disagreement are noted on the chart. I was personally present for the key portions of any procedures. I have documented in the chart those procedures where I was not present during the key portions. I have reviewed the emergency nurses triage note. I agree with the chief complaint, past medical history, past surgical history, allergies, medications, social and family history as documented unless otherwise noted below. For Physician Assistant/ Nurse Practitioner cases/documentation I have personally evaluated this patient and have completed at least one if not all key elements of the E/M (history, physical exam, and MDM). Additional findings are as noted. Primary Care Physician: Leigh Ann Munguia MD    Screenings:  [unfilled]    CHIEF COMPLAINT       Chief Complaint   Patient presents with    Hand Pain       RECENT VITALS:   Temp: 98.4 °F (36.9 °C),  Heart Rate: 75, Resp: 14, BP: 129/84    LABS:  Labs Reviewed - No data to display    Radiology  XR HAND RIGHT (MIN 3 VIEWS)   Preliminary Result   LEFT HAND: Acute traumatic transversely oriented minimally displaced fracture   involving proximal metaphysis of 2nd distal phalanx. Questionable acute traumatic nondisplaced fracture involving distal aspect of   2nd distal phalanx only appreciated on lateral projection. Clinical   correlation as to point tenderness suggested. RIGHT HAND: No acute abnormality. XR HAND LEFT (MIN 3 VIEWS)   Preliminary Result   LEFT HAND: Acute traumatic transversely oriented minimally displaced fracture   involving proximal metaphysis of 2nd distal phalanx.       Questionable acute traumatic nondisplaced fracture To get better and follow your care plan as instructed.

## 2023-03-26 NOTE — ED PROVIDER NOTES
[facility]  ED  Emergency Department Encounter  Emergency Medicine Resident     Pt Name:Beth Shultz  MRN: [id number]  [place] 1985  Date of evaluation: 3/26/23  PCP:  Jaime Glynn MD      [address]       Chief Complaint   Patient presents with    Hand Pain       HISTORY OF PRESENT ILLNESS  (Location/Symptom, Timing/Onset, Context/Setting, Quality, Duration, Modifying Factors, Severity.)      Tete Lee is a 42 y.o. female who presents with Workmen's Comp. claim. 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 She states that she attempted to load too much weight onto a pallet riley and move it off the truck down to her residence. She was unable to, asked the home owner for help, she did not know that he had a bad right shoulder, his shoulder gave out and the freight came back on the [employer] riley and smashed her 2 hands. She attempted to go to the urgent care twice but there is no x-ray available. She has left middle finger swelling and deformity. She states that the tips of her right fingers hurt as well as she has numbness. No other injury or trauma. She has not attempted anything for relief to this point yet. PAST MEDICAL / SURGICAL / SOCIAL / FAMILY HISTORY      has a past medical history of Anxiety, Clostridium difficile infection, GERD (gastroesophageal reflux disease), Heavy menses, Hyperlipidemia, Pelvic pain, and UTI (urinary tract infection). has a past surgical history that includes Hysterectomy, total abdominal (N/A, 2020).       Social History     Socioeconomic History    Marital status: Single     Spouse name: Not on file    Number of children: Not on file    Years of education: Not on file    Highest education level: Not on file   Occupational History    Not on file   Tobacco Use    Smoking status: Former     Packs/day: 1.50     Years: 9.50     Pack years: 14.25     Types: Cigarettes     Quit date: 2014     Years since quittin.7

## 2023-03-26 NOTE — DISCHARGE INSTRUCTIONS
Take the antibiotics as prescribed  Follow-up with plastic surgery. Follow-up with occupational health.   You can take Tylenol 500 mg 4 times daily or Motrin 400 mg 4 times daily for pain  Make sure that you wash the hand with soap and water every day

## 2023-03-28 PROBLEM — S62.661A CLOSED NONDISPLACED FRACTURE OF DISTAL PHALANX OF LEFT INDEX FINGER: Status: ACTIVE | Noted: 2023-03-28

## 2023-03-28 PROBLEM — S62.663B OPEN NONDISPLACED FRACTURE OF DISTAL PHALANX OF LEFT MIDDLE FINGER: Status: ACTIVE | Noted: 2023-03-28

## 2024-01-08 ENCOUNTER — HOSPITAL ENCOUNTER (OUTPATIENT)
Age: 39
Discharge: HOME | End: 2024-01-08
Payer: COMMERCIAL

## 2024-01-08 LAB
ALT SERPL-CCNC: 24 U/L (ref 5–33)
AST SERPL-CCNC: 21 U/L
CHOLEST SERPL-MCNC: 169 MG/DL
CHOLESTEROL/HDL RATIO: 3.4
CK SERPL-CCNC: 46 U/L (ref 26–192)
HDLC SERPL-MCNC: 50 MG/DL
LDLC SERPL CALC-MCNC: 101 MG/DL (ref 0–130)
TRIGL SERPL-MCNC: 92 MG/DL

## 2024-01-08 PROCEDURE — 80061 LIPID PANEL: CPT

## 2024-01-08 PROCEDURE — 84450 TRANSFERASE (AST) (SGOT): CPT

## 2024-01-08 PROCEDURE — 82550 ASSAY OF CK (CPK): CPT

## 2024-01-08 PROCEDURE — 84460 ALANINE AMINO (ALT) (SGPT): CPT

## 2024-01-08 PROCEDURE — 36415 COLL VENOUS BLD VENIPUNCTURE: CPT

## (undated) DEVICE — MERCY HEALTH ST CHARLES: Brand: MEDLINE INDUSTRIES, INC.

## (undated) DEVICE — PAD,NON-ADHERENT,3X8,STERILE,LF,1/PK: Brand: MEDLINE

## (undated) DEVICE — SUTURE VCRL + SZ 3-0 L27IN ABSRB UD L26MM SH 1/2 CIR VCP416H

## (undated) DEVICE — SOLUTION PREP POVIDONE IOD FOR SKIN MUCOUS MEM PRIOR TO

## (undated) DEVICE — SOLUTION SCRB 4OZ 10% POVIDONE IOD ANTIMIC BTL

## (undated) DEVICE — SUTURE VCRL + SZ 2-0 L27IN ABSRB WHT SH 1/2 CIR TAPERCUT VCP417H

## (undated) DEVICE — HYPODERMIC SAFETY NEEDLE: Brand: MAGELLAN

## (undated) DEVICE — SUTURE COAT VCRL + LIGAPAK LIG REEL 54 IN SZ 0 UD BRAID VCP287G

## (undated) DEVICE — TOTAL TRAY, 16FR 10ML SIL FOLEY, URN: Brand: MEDLINE

## (undated) DEVICE — SUTURE VCRL + SZ 2-0 L27IN ABSRB UD CT-2 L26MM 1/2 CIR TAPR VCP269H

## (undated) DEVICE — YANKAUER,BULB TIP,W/O VENT,RIGID,STERILE: Brand: MEDLINE

## (undated) DEVICE — DRESSING TRNSPAR W4XL10IN FLM MIC POR SURESITE 123

## (undated) DEVICE — GLOVE SURG SZ 8 L12IN FNGR THK75MIL WHT LTX POLYMER BEAD

## (undated) DEVICE — SOLUTION IV IRRIG WATER 1000ML POUR BRL 2F7114

## (undated) DEVICE — Z DISCONTINUED BY MEDLINE USE 2711682 TRAY SKIN PREP DRY W/ PREM GLV

## (undated) DEVICE — SYRINGE 20ML LL S/C 50

## (undated) DEVICE — SINGLE PORT MANIFOLD: Brand: NEPTUNE 2

## (undated) DEVICE — DRAPE IRRIG FLD WRM W44XL44IN W/ AORN STD PRTBL INTRATEMP

## (undated) DEVICE — GOWN,SIRUS,NONRNF,SETINSLV,XL,20/CS: Brand: MEDLINE

## (undated) DEVICE — SPONGE LAP W18XL18IN WHT COT 4 PLY FLD STRUNG RADPQ DISP ST

## (undated) DEVICE — STRIP,CLOSURE,WOUND,MEDI-STRIP,1/2X4: Brand: MEDLINE

## (undated) DEVICE — ST CHARLES MAJOR ABDOMINAL PK: Brand: MEDLINE INDUSTRIES, INC.

## (undated) DEVICE — SUTURE VCRL + SZ 4-0 L27IN ABSRB WHT FS-2 3/8 CIR REV CUT VCP422H

## (undated) DEVICE — SUTURE VCRL SZ 0 L36IN ABSRB UD CT-1 L36MM 1/2 CIR TAPR PNT VCP946H

## (undated) DEVICE — SUTURE MCRYL + SZ 4-0 L27IN ABSRB UD L19MM PS-2 3/8 CIR MCP426H

## (undated) DEVICE — BLANKET WRM W29.9XL79.1IN UP BODY FORC AIR MISTRAL-AIR

## (undated) DEVICE — PREVENA INCISION MANAGEMENT SYSTEM- PEEL & PLACE DRESSING: Brand: PREVENA™ PEEL & PLACE™